# Patient Record
Sex: FEMALE | Race: BLACK OR AFRICAN AMERICAN | NOT HISPANIC OR LATINO | Employment: FULL TIME | URBAN - METROPOLITAN AREA
[De-identification: names, ages, dates, MRNs, and addresses within clinical notes are randomized per-mention and may not be internally consistent; named-entity substitution may affect disease eponyms.]

---

## 2017-06-30 ENCOUNTER — OFFICE VISIT (OUTPATIENT)
Dept: BARIATRICS | Facility: CLINIC | Age: 37
End: 2017-06-30
Payer: COMMERCIAL

## 2017-06-30 VITALS
SYSTOLIC BLOOD PRESSURE: 133 MMHG | HEIGHT: 67 IN | DIASTOLIC BLOOD PRESSURE: 77 MMHG | BODY MASS INDEX: 39.65 KG/M2 | RESPIRATION RATE: 16 BRPM | WEIGHT: 252.63 LBS | HEART RATE: 89 BPM | TEMPERATURE: 99 F

## 2017-06-30 DIAGNOSIS — M25.562 CHRONIC PAIN OF BOTH KNEES: ICD-10-CM

## 2017-06-30 DIAGNOSIS — G47.9 SLEEP DISTURBANCE: ICD-10-CM

## 2017-06-30 DIAGNOSIS — M25.561 CHRONIC PAIN OF BOTH KNEES: ICD-10-CM

## 2017-06-30 DIAGNOSIS — E66.01 MORBID OBESITY WITH BMI OF 40.0-44.9, ADULT: ICD-10-CM

## 2017-06-30 DIAGNOSIS — E66.01 MORBID OBESITY DUE TO EXCESS CALORIES: Primary | ICD-10-CM

## 2017-06-30 DIAGNOSIS — G89.29 CHRONIC PAIN OF BOTH KNEES: ICD-10-CM

## 2017-06-30 PROCEDURE — 99204 OFFICE O/P NEW MOD 45 MIN: CPT | Mod: S$GLB,,, | Performed by: SURGERY

## 2017-06-30 PROCEDURE — 99999 PR PBB SHADOW E&M-NEW PATIENT-LVL IV: CPT | Mod: PBBFAC,,, | Performed by: SURGERY

## 2017-06-30 RX ORDER — NORGESTIMATE AND ETHINYL ESTRADIOL 7DAYSX3 28
1 KIT ORAL DAILY
COMMUNITY
End: 2020-03-24 | Stop reason: CLARIF

## 2019-06-10 NOTE — PROGRESS NOTES
Initial Consult    Chief Complaint   Patient presents with    Weight Loss     0/3 MSD Consecutive       History of Present Illness:  Patient is a 36 y.o. female who is referred for evaluation of surgical treatment of morbid obesity. Her Body mass index is 39.57 kg/m². She has known comorbidities of urinary incontinence. She has attended the bariatric seminar and is most interested in gastric sleeve surgery.    Past attempts at weight loss include: Unsupervised: calorie counting, gym membership, low carbohydrates;  Supervised:  Diet pills from MD;  Diet pills: adipex, orlistat;  Exercise attempts: walking or running, treadmill, group classes, jogging    Weight history:   At current weight:  3 years  Obese for 10 years.  More than 35 pounds overweight for 10 years.  More than 100 pounds overweight for 3 years.  Started dieting at 29 years old.  Maximum weight reached: 252  Most weight lost was 30 pounds through calorie restriction, no sugar, no meat, no salt diet pills and exercise for 1 year.  She describes Her eating habits as sweet eater, snacker/grazer.    RICH screening: snores at home, wakes frequently at night, daytime fatigue, takes muscle relaxer to sleep, morning migraine    Reflux screening: Has daily but doesn't take anything for it    Review of patient's allergies indicates:   Allergen Reactions    Sulfasalazine Itching       Current Outpatient Prescriptions   Medication Sig Dispense Refill    norgestimate-ethinyl estradiol (ORTHO TRI-CYCLEN,TRI-SPRINTEC) 0.18/0.215/0.25 mg-35 mcg (28) tablet Take 1 tablet by mouth once daily.       No current facility-administered medications for this visit.        Past Medical History:   Diagnosis Date    Graves disease      Past Surgical History:   Procedure Laterality Date     SECTION      WISDOM TOOTH EXTRACTION       Family History   Problem Relation Age of Onset    Hypertension Mother     Hypertension Father     Heart disease Father     Kidney  failure Father     Diabetes Father     Hyperlipidemia Father     Stroke Father     No Known Problems Sister     No Known Problems Brother     No Known Problems Daughter     Heart disease Maternal Grandmother     Cancer Maternal Grandfather     Cancer Paternal Grandfather      stomach    No Known Problems Brother     No Known Problems Brother     No Known Problems Daughter      Social History   Substance Use Topics    Smoking status: Current Some Day Smoker     Packs/day: 0.50     Years: 10.00     Types: Cigarettes     Start date: 6/30/2007    Smokeless tobacco: Never Used    Alcohol use No          Review of Systems:  Review of Systems   Constitutional: Negative for activity change, appetite change, fever and unexpected weight change.   HENT: Positive for sinus pressure. Negative for congestion, sneezing, sore throat, tinnitus and voice change.    Eyes: Negative for pain, redness and itching.   Respiratory: Negative for apnea, cough, choking, chest tightness, shortness of breath, wheezing and stridor.    Cardiovascular: Negative for chest pain, palpitations and leg swelling.   Gastrointestinal: Negative for abdominal distention, abdominal pain, anal bleeding, blood in stool, constipation, diarrhea, nausea, rectal pain and vomiting.   Endocrine: Negative for cold intolerance and heat intolerance.   Genitourinary: Negative for difficulty urinating and dysuria.   Musculoskeletal: Positive for arthralgias, back pain and gait problem. Negative for joint swelling, myalgias, neck pain and neck stiffness.   Skin: Negative for rash and wound.   Allergic/Immunologic: Negative for environmental allergies and food allergies.   Neurological: Positive for light-headedness and headaches. Negative for dizziness.   Hematological: Negative for adenopathy. Does not bruise/bleed easily.   Psychiatric/Behavioral: Negative for agitation and confusion.       Physical:     Vital Signs (Most Recent)  Temp: 99.1 °F (37.3 °C)  "(06/30/17 1431)  Pulse: 89 (06/30/17 1431)  Resp: 16 (06/30/17 1431)  BP: 133/77 (06/30/17 1431)  5' 7" (1.702 m)  114.6 kg (252 lb 10.4 oz)   Neck 14.5 inch    Body comp:  Fat Percent:  47.8 %  Fat Mass:  119 lb  FFM:  130 lb  TBW: 95.6 lb  TBW %:  38.4 %  BMR: 1861 kcal          Physical Exam:  Physical Exam   Constitutional: She is oriented to person, place, and time. She appears well-developed and well-nourished. No distress.   HENT:   Head: Normocephalic and atraumatic.   Mouth/Throat: No oropharyngeal exudate.   Eyes: Conjunctivae and EOM are normal. Pupils are equal, round, and reactive to light. No scleral icterus.   Neck: Normal range of motion. Neck supple. No JVD present. No tracheal deviation present. No thyromegaly present.   Cardiovascular: Normal rate, regular rhythm and normal heart sounds.  Exam reveals no gallop and no friction rub.    No murmur heard.  Pulmonary/Chest: Effort normal and breath sounds normal. No stridor. No respiratory distress. She has no wheezes. She has no rales. She exhibits no tenderness.   Abdominal: Soft. Bowel sounds are normal. She exhibits no distension and no mass. There is no tenderness. There is no rebound and no guarding.   Musculoskeletal: Normal range of motion. She exhibits edema. She exhibits no tenderness.   Lymphadenopathy:     She has no cervical adenopathy.   Neurological: She is alert and oriented to person, place, and time. No cranial nerve deficit.   Skin: Skin is warm and dry. No rash noted. She is not diaphoretic. No erythema.   Psychiatric: She has a normal mood and affect. Her behavior is normal.   Nursing note and vitals reviewed.      ASSESSMENT/PLAN:        1. Morbid obesity due to excess calories  BMP    CBC w/ Auto Differential    Folate Serum    H. Pylori Antibody, IGG    Hg A1c    Hepatic Panel    Iron & TIBC    Lipid Profile    Magnesium    Phosphorus    T3    T4    TSH    Free T4    Vitamin B12    Vitamin B1    Vitamin D 25 Hydroxy    EKG " 12-lead    FL Upper GI Without KUB    Ambulatory consult to Psychology    Ambulatory consult to Nutrition Services   2. Morbid obesity with BMI of 40.0-44.9, adult     3. Chronic pain of both knees  X-Ray Knee 1 or 2 View Bilateral   4. Sleep disturbance  Polysomnography 4 or more parameters       Plan:  Morelia Geiger has morbid obesity as their Body mass index is 39.57 kg/m².  She does not qualify for weight loss surgery at this time but has symptoms related to sleep apnea and arthritis.  I will pursue these studies and find out if she has these problems which would qualify her for surgery.  In the meantime I will continue work up for dieting and possible surgery. She understands that this is a tool and lifestyle change will be necessary to keep weight off. I went over possible complications of all surgeries with the patient and she is agreeable to surgery.    She will need:    Labs  EKG  UGI   dietary consult  psych consult   Seminar  Sleep study   Xray of knees    I will obtain the following clearances prior to surgery: none        Diet plan: high protein low carb- mainly meats and vegetables  Exercise plan: Cardiovascular exercise, get HR over 100 for 20 minutes 3 times per week.  Start multivitamin      143

## 2020-01-06 ENCOUNTER — TELEPHONE (OUTPATIENT)
Dept: BARIATRICS | Facility: CLINIC | Age: 40
End: 2020-01-06

## 2020-01-06 NOTE — TELEPHONE ENCOUNTER
----- Message from Karsten Whitehead sent at 1/6/2020  8:53 AM CST -----  Contact: pt  Type:  Sooner Apoointment Request    Caller is requesting a sooner appointment.  Caller declined first available appointment listed below.  Caller will not accept being placed on the waitlist and is requesting a message be sent to doctor.    Name of Caller:  pt  When is the first available appointment?  01/22/2020    Best Call Back Number:  [phone]  Additional Information:  Pt has an appointment on 01/22/2020 and would like to know if she can be seen sooner than that. Please call to advise.

## 2020-03-24 ENCOUNTER — HOSPITAL ENCOUNTER (INPATIENT)
Facility: HOSPITAL | Age: 40
LOS: 9 days | Discharge: HOME | DRG: 177 | End: 2020-04-03
Attending: EMERGENCY MEDICINE | Admitting: INTERNAL MEDICINE
Payer: MEDICARE

## 2020-03-24 DIAGNOSIS — J12.82 PNEUMONIA DUE TO COVID-19 VIRUS: Primary | ICD-10-CM

## 2020-03-24 DIAGNOSIS — R00.0 TACHYCARDIA: ICD-10-CM

## 2020-03-24 DIAGNOSIS — J18.9 PNEUMONIA: ICD-10-CM

## 2020-03-24 DIAGNOSIS — R06.02 SOB (SHORTNESS OF BREATH): ICD-10-CM

## 2020-03-24 DIAGNOSIS — U07.1 PNEUMONIA DUE TO COVID-19 VIRUS: Primary | ICD-10-CM

## 2020-03-24 LAB
ALBUMIN SERPL BCP-MCNC: 3.3 G/DL (ref 3.5–5.2)
ALP SERPL-CCNC: 50 U/L (ref 55–135)
ALT SERPL W/O P-5'-P-CCNC: 36 U/L (ref 10–44)
ANION GAP SERPL CALC-SCNC: 12 MMOL/L (ref 8–16)
AST SERPL-CCNC: 70 U/L (ref 10–40)
BILIRUB SERPL-MCNC: 1.5 MG/DL (ref 0.1–1)
BNP SERPL-MCNC: 15 PG/ML (ref 0–99)
BUN SERPL-MCNC: 5 MG/DL (ref 6–20)
CALCIUM SERPL-MCNC: 8.2 MG/DL (ref 8.7–10.5)
CHLORIDE SERPL-SCNC: 105 MMOL/L (ref 95–110)
CK MB SERPL-MCNC: 0.7 NG/ML (ref 0.1–6.5)
CK SERPL-CCNC: 476 U/L (ref 20–180)
CO2 SERPL-SCNC: 21 MMOL/L (ref 23–29)
CREAT SERPL-MCNC: 0.9 MG/DL (ref 0.5–1.4)
EST. GFR  (AFRICAN AMERICAN): >60 ML/MIN/1.73 M^2
EST. GFR  (NON AFRICAN AMERICAN): >60 ML/MIN/1.73 M^2
GLUCOSE SERPL-MCNC: 122 MG/DL (ref 70–110)
INFLUENZA A, MOLECULAR: NEGATIVE
INFLUENZA B, MOLECULAR: NEGATIVE
LACTATE SERPL-SCNC: 1.2 MMOL/L (ref 0.5–1.9)
LDH SERPL L TO P-CCNC: 388 U/L (ref 110–260)
POTASSIUM SERPL-SCNC: 3.6 MMOL/L (ref 3.5–5.1)
PROCALCITONIN SERPL IA-MCNC: 0.37 NG/ML (ref 0–0.5)
PROT SERPL-MCNC: 7.8 G/DL (ref 6–8.4)
SODIUM SERPL-SCNC: 138 MMOL/L (ref 136–145)
SPECIMEN SOURCE: NORMAL
TROPONIN I SERPL DL<=0.01 NG/ML-MCNC: <0.03 NG/ML

## 2020-03-24 PROCEDURE — 87502 INFLUENZA DNA AMP PROBE: CPT

## 2020-03-24 PROCEDURE — 82550 ASSAY OF CK (CPK): CPT

## 2020-03-24 PROCEDURE — 84484 ASSAY OF TROPONIN QUANT: CPT

## 2020-03-24 PROCEDURE — 96365 THER/PROPH/DIAG IV INF INIT: CPT

## 2020-03-24 PROCEDURE — 63600175 PHARM REV CODE 636 W HCPCS: Performed by: EMERGENCY MEDICINE

## 2020-03-24 PROCEDURE — U0002 COVID-19 LAB TEST NON-CDC: HCPCS

## 2020-03-24 PROCEDURE — 83880 ASSAY OF NATRIURETIC PEPTIDE: CPT

## 2020-03-24 PROCEDURE — 83615 LACTATE (LD) (LDH) ENZYME: CPT

## 2020-03-24 PROCEDURE — 81025 URINE PREGNANCY TEST: CPT | Performed by: EMERGENCY MEDICINE

## 2020-03-24 PROCEDURE — 99285 EMERGENCY DEPT VISIT HI MDM: CPT | Mod: 25

## 2020-03-24 PROCEDURE — 87040 BLOOD CULTURE FOR BACTERIA: CPT | Mod: 59

## 2020-03-24 PROCEDURE — 93005 ELECTROCARDIOGRAM TRACING: CPT | Performed by: INTERNAL MEDICINE

## 2020-03-24 PROCEDURE — 36415 COLL VENOUS BLD VENIPUNCTURE: CPT

## 2020-03-24 PROCEDURE — 82553 CREATINE MB FRACTION: CPT

## 2020-03-24 PROCEDURE — 80053 COMPREHEN METABOLIC PANEL: CPT

## 2020-03-24 PROCEDURE — 82728 ASSAY OF FERRITIN: CPT

## 2020-03-24 PROCEDURE — 83605 ASSAY OF LACTIC ACID: CPT

## 2020-03-24 PROCEDURE — 84145 PROCALCITONIN (PCT): CPT

## 2020-03-24 RX ORDER — NORGESTIMATE AND ETHINYL ESTRADIOL 0.25-0.035
1 KIT ORAL DAILY
COMMUNITY
Start: 2020-02-20

## 2020-03-24 RX ADMIN — CEFTRIAXONE SODIUM 1 G: 1 INJECTION, POWDER, FOR SOLUTION INTRAMUSCULAR; INTRAVENOUS at 11:03

## 2020-03-25 PROBLEM — J18.9 PNEUMONIA: Status: ACTIVE | Noted: 2020-03-25

## 2020-03-25 PROBLEM — Z20.822 SUSPECTED COVID-19 VIRUS INFECTION: Status: ACTIVE | Noted: 2020-03-25

## 2020-03-25 LAB
B-HCG UR QL: NEGATIVE
BACTERIA #/AREA URNS HPF: ABNORMAL /HPF
BASOPHILS # BLD AUTO: 0.01 K/UL (ref 0–0.2)
BASOPHILS NFR BLD: 0.3 % (ref 0–1.9)
BILIRUB UR QL STRIP: ABNORMAL
CLARITY UR: ABNORMAL
COLOR UR: YELLOW
CRP SERPL-MCNC: 2.88 MG/DL (ref 0–0.75)
CTP QC/QA: YES
D DIMER PPP IA.FEU-MCNC: 0.79 UG/ML FEU
DIFFERENTIAL METHOD: ABNORMAL
EOSINOPHIL # BLD AUTO: 0 K/UL (ref 0–0.5)
EOSINOPHIL NFR BLD: 0.9 % (ref 0–8)
ERYTHROCYTE [DISTWIDTH] IN BLOOD BY AUTOMATED COUNT: 15.6 % (ref 11.5–14.5)
ERYTHROCYTE [SEDIMENTATION RATE] IN BLOOD BY WESTERGREN METHOD: 58 MM/HR (ref 0–20)
FERRITIN SERPL-MCNC: 318 NG/ML (ref 20–300)
GLUCOSE UR QL STRIP: ABNORMAL
HCT VFR BLD AUTO: 41.3 % (ref 37–48.5)
HGB BLD-MCNC: 12.5 G/DL (ref 12–16)
HGB UR QL STRIP: ABNORMAL
HYALINE CASTS #/AREA URNS LPF: 91 /LPF
IMM GRANULOCYTES # BLD AUTO: 0.02 K/UL (ref 0–0.04)
IMM GRANULOCYTES NFR BLD AUTO: 0.6 % (ref 0–0.5)
INR PPP: 1
KETONES UR QL STRIP: ABNORMAL
LACTATE SERPL-SCNC: 1.2 MMOL/L (ref 0.5–1.9)
LEUKOCYTE ESTERASE UR QL STRIP: NEGATIVE
LYMPHOCYTES # BLD AUTO: 0.8 K/UL (ref 1–4.8)
LYMPHOCYTES NFR BLD: 21.9 % (ref 18–48)
MCH RBC QN AUTO: 25.8 PG (ref 27–31)
MCHC RBC AUTO-ENTMCNC: 30.3 G/DL (ref 32–36)
MCV RBC AUTO: 85 FL (ref 82–98)
MICROSCOPIC COMMENT: ABNORMAL
MONOCYTES # BLD AUTO: 0.1 K/UL (ref 0.3–1)
MONOCYTES NFR BLD: 3.8 % (ref 4–15)
NEUTROPHILS # BLD AUTO: 2.5 K/UL (ref 1.8–7.7)
NEUTROPHILS NFR BLD: 72.5 % (ref 38–73)
NITRITE UR QL STRIP: NEGATIVE
NRBC BLD-RTO: 0 /100 WBC
PH UR STRIP: 7 [PH] (ref 5–8)
PLATELET # BLD AUTO: 125 K/UL (ref 150–350)
PMV BLD AUTO: 11.1 FL (ref 9.2–12.9)
PROT UR QL STRIP: ABNORMAL
PROTHROMBIN TIME: 12.7 SEC (ref 10.6–14.8)
RBC # BLD AUTO: 4.84 M/UL (ref 4–5.4)
RBC #/AREA URNS HPF: 21 /HPF (ref 0–4)
SP GR UR STRIP: >1.03 (ref 1–1.03)
SQUAMOUS #/AREA URNS HPF: 15 /HPF
URN SPEC COLLECT METH UR: ABNORMAL
UROBILINOGEN UR STRIP-ACNC: >=8 EU/DL
WBC # BLD AUTO: 3.42 K/UL (ref 3.9–12.7)
WBC #/AREA URNS HPF: 10 /HPF (ref 0–5)

## 2020-03-25 PROCEDURE — 81001 URINALYSIS AUTO W/SCOPE: CPT

## 2020-03-25 PROCEDURE — 36415 COLL VENOUS BLD VENIPUNCTURE: CPT

## 2020-03-25 PROCEDURE — 85379 FIBRIN DEGRADATION QUANT: CPT

## 2020-03-25 PROCEDURE — 96368 THER/DIAG CONCURRENT INF: CPT

## 2020-03-25 PROCEDURE — 63600175 PHARM REV CODE 636 W HCPCS: Performed by: EMERGENCY MEDICINE

## 2020-03-25 PROCEDURE — 63600175 PHARM REV CODE 636 W HCPCS: Performed by: NURSE PRACTITIONER

## 2020-03-25 PROCEDURE — 25000003 PHARM REV CODE 250: Performed by: INTERNAL MEDICINE

## 2020-03-25 PROCEDURE — 85610 PROTHROMBIN TIME: CPT

## 2020-03-25 PROCEDURE — 25000003 PHARM REV CODE 250: Performed by: FAMILY MEDICINE

## 2020-03-25 PROCEDURE — 63600175 PHARM REV CODE 636 W HCPCS: Performed by: INTERNAL MEDICINE

## 2020-03-25 PROCEDURE — 85025 COMPLETE CBC W/AUTO DIFF WBC: CPT

## 2020-03-25 PROCEDURE — 27000221 HC OXYGEN, UP TO 24 HOURS

## 2020-03-25 PROCEDURE — 94761 N-INVAS EAR/PLS OXIMETRY MLT: CPT

## 2020-03-25 PROCEDURE — 86140 C-REACTIVE PROTEIN: CPT

## 2020-03-25 PROCEDURE — 83605 ASSAY OF LACTIC ACID: CPT

## 2020-03-25 PROCEDURE — 12000002 HC ACUTE/MED SURGE SEMI-PRIVATE ROOM

## 2020-03-25 PROCEDURE — 85041 AUTOMATED RBC COUNT: CPT

## 2020-03-25 PROCEDURE — 85651 RBC SED RATE NONAUTOMATED: CPT

## 2020-03-25 PROCEDURE — 25000003 PHARM REV CODE 250: Performed by: NURSE PRACTITIONER

## 2020-03-25 RX ORDER — SODIUM,POTASSIUM PHOSPHATES 280-250MG
2 POWDER IN PACKET (EA) ORAL
Status: DISCONTINUED | OUTPATIENT
Start: 2020-03-25 | End: 2020-04-03 | Stop reason: HOSPADM

## 2020-03-25 RX ORDER — ATORVASTATIN CALCIUM 40 MG/1
40 TABLET, FILM COATED ORAL NIGHTLY
Status: DISCONTINUED | OUTPATIENT
Start: 2020-03-25 | End: 2020-04-03 | Stop reason: HOSPADM

## 2020-03-25 RX ORDER — ONDANSETRON 2 MG/ML
4 INJECTION INTRAMUSCULAR; INTRAVENOUS EVERY 8 HOURS PRN
Status: DISCONTINUED | OUTPATIENT
Start: 2020-03-25 | End: 2020-04-03 | Stop reason: HOSPADM

## 2020-03-25 RX ORDER — POTASSIUM CHLORIDE 20 MEQ/15ML
40 SOLUTION ORAL
Status: DISCONTINUED | OUTPATIENT
Start: 2020-03-25 | End: 2020-04-03 | Stop reason: HOSPADM

## 2020-03-25 RX ORDER — BISACODYL 10 MG
10 SUPPOSITORY, RECTAL RECTAL DAILY PRN
Status: DISCONTINUED | OUTPATIENT
Start: 2020-03-25 | End: 2020-04-03 | Stop reason: HOSPADM

## 2020-03-25 RX ORDER — LANOLIN ALCOHOL/MO/W.PET/CERES
800 CREAM (GRAM) TOPICAL
Status: DISCONTINUED | OUTPATIENT
Start: 2020-03-25 | End: 2020-04-03 | Stop reason: HOSPADM

## 2020-03-25 RX ORDER — LOPERAMIDE HYDROCHLORIDE 2 MG/1
2 CAPSULE ORAL EVERY 6 HOURS PRN
Status: DISCONTINUED | OUTPATIENT
Start: 2020-03-25 | End: 2020-04-03 | Stop reason: HOSPADM

## 2020-03-25 RX ORDER — HYDROCODONE POLISTIREX AND CHLORPHENIRAMINE POLISTIREX 10; 8 MG/5ML; MG/5ML
5 SUSPENSION, EXTENDED RELEASE ORAL EVERY 12 HOURS PRN
Status: DISCONTINUED | OUTPATIENT
Start: 2020-03-25 | End: 2020-04-03 | Stop reason: HOSPADM

## 2020-03-25 RX ORDER — ACETAMINOPHEN 325 MG/1
650 TABLET ORAL EVERY 8 HOURS PRN
Status: DISCONTINUED | OUTPATIENT
Start: 2020-03-25 | End: 2020-03-29

## 2020-03-25 RX ORDER — GUAIFENESIN 600 MG/1
600 TABLET, EXTENDED RELEASE ORAL 2 TIMES DAILY
Status: DISCONTINUED | OUTPATIENT
Start: 2020-03-25 | End: 2020-04-03 | Stop reason: HOSPADM

## 2020-03-25 RX ORDER — SODIUM CHLORIDE 0.9 % (FLUSH) 0.9 %
10 SYRINGE (ML) INJECTION
Status: DISCONTINUED | OUTPATIENT
Start: 2020-03-25 | End: 2020-04-03 | Stop reason: HOSPADM

## 2020-03-25 RX ORDER — HYDROXYCHLOROQUINE SULFATE 200 MG/1
400 TABLET, FILM COATED ORAL DAILY
Status: COMPLETED | OUTPATIENT
Start: 2020-03-26 | End: 2020-03-29

## 2020-03-25 RX ORDER — ACETAMINOPHEN 325 MG/1
650 TABLET ORAL EVERY 4 HOURS PRN
Status: DISCONTINUED | OUTPATIENT
Start: 2020-03-25 | End: 2020-03-29

## 2020-03-25 RX ORDER — HYDROCODONE BITARTRATE AND ACETAMINOPHEN 5; 325 MG/1; MG/1
1 TABLET ORAL EVERY 6 HOURS PRN
Status: DISCONTINUED | OUTPATIENT
Start: 2020-03-25 | End: 2020-03-29

## 2020-03-25 RX ORDER — AZITHROMYCIN 250 MG/1
500 TABLET, FILM COATED ORAL
Status: DISPENSED | OUTPATIENT
Start: 2020-03-25 | End: 2020-03-28

## 2020-03-25 RX ORDER — ENOXAPARIN SODIUM 100 MG/ML
40 INJECTION SUBCUTANEOUS EVERY 24 HOURS
Status: DISCONTINUED | OUTPATIENT
Start: 2020-03-25 | End: 2020-04-03 | Stop reason: HOSPADM

## 2020-03-25 RX ORDER — HYDROXYCHLOROQUINE SULFATE 200 MG/1
400 TABLET, FILM COATED ORAL 2 TIMES DAILY
Status: COMPLETED | OUTPATIENT
Start: 2020-03-25 | End: 2020-03-25

## 2020-03-25 RX ORDER — TALC
6 POWDER (GRAM) TOPICAL NIGHTLY PRN
Status: DISCONTINUED | OUTPATIENT
Start: 2020-03-25 | End: 2020-04-03 | Stop reason: HOSPADM

## 2020-03-25 RX ADMIN — HYDROCODONE POLISTIREX AND CHLORPHENIRAMINE POLISITREX 5 ML: 10; 8 SUSPENSION, EXTENDED RELEASE ORAL at 03:03

## 2020-03-25 RX ADMIN — AZITHROMYCIN MONOHYDRATE 500 MG: 500 INJECTION, POWDER, LYOPHILIZED, FOR SOLUTION INTRAVENOUS at 12:03

## 2020-03-25 RX ADMIN — HYDROXYCHLOROQUINE SULFATE 400 MG: 200 TABLET, FILM COATED ORAL at 08:03

## 2020-03-25 RX ADMIN — ENOXAPARIN SODIUM 40 MG: 100 INJECTION SUBCUTANEOUS at 04:03

## 2020-03-25 RX ADMIN — HYDROXYCHLOROQUINE SULFATE 400 MG: 200 TABLET, FILM COATED ORAL at 09:03

## 2020-03-25 RX ADMIN — GUAIFENESIN 600 MG: 600 TABLET, EXTENDED RELEASE ORAL at 10:03

## 2020-03-25 RX ADMIN — ATORVASTATIN CALCIUM 40 MG: 40 TABLET, FILM COATED ORAL at 08:03

## 2020-03-25 RX ADMIN — MELATONIN 6 MG: at 03:03

## 2020-03-25 RX ADMIN — ONDANSETRON 4 MG: 2 INJECTION INTRAMUSCULAR; INTRAVENOUS at 03:03

## 2020-03-25 RX ADMIN — CEFTRIAXONE 1 G: 1 INJECTION, SOLUTION INTRAVENOUS at 10:03

## 2020-03-25 RX ADMIN — ACETAMINOPHEN 650 MG: 325 TABLET ORAL at 03:03

## 2020-03-25 RX ADMIN — ACETAMINOPHEN 650 MG: 325 TABLET ORAL at 04:03

## 2020-03-25 NOTE — PLAN OF CARE
03/25/20 0826   Discharge Assessment   Assessment Type Discharge Planning Assessment   Confirmed/corrected address and phone number on facesheet? Yes   Assessment information obtained from? Patient   Communicated expected length of stay with patient/caregiver no   Prior to hospitilization cognitive status: Alert/Oriented   Prior to hospitalization functional status: Independent   Current cognitive status: Alert/Oriented   Current Functional Status: Independent   Facility Arrived From: home   Lives With spouse;child(lobo), dependent   Able to Return to Prior Arrangements yes   Is patient able to care for self after discharge? Yes   Readmission Within the Last 30 Days no previous admission in last 30 days   Patient currently being followed by outpatient case management? No   Patient currently receives any other outside agency services? No   Equipment Currently Used at Home none   Do you have any problems affording any of your prescribed medications? No   Is the patient taking medications as prescribed? yes   Does the patient have transportation home? Yes   Transportation Anticipated family or friend will provide   Dialysis Name and Scheduled days n/a   Does the patient receive services at the [facility]? No   Discharge Plan A Home   Discharge Plan B Home with family   DME Needed Upon Discharge  none   Patient/Family in Agreement with Plan yes     CM called into patients room, introduced self and asked if ok to do interview over phone.

## 2020-03-25 NOTE — HPI
The patient is a 39 year old female former smoker with no significant medical history. She presented to the ED with persistent moderate to severe nonproductive cough, associated with fever and mild-moderate shortness of breath for a week. She also reports poor appetite with mild-moderate nausea and some loose stools. She has mid-moderate mid chest pain with cough. She denies sick contacts or recent traveling. 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    In the ED:  Febrile, 101.5  Tachycardia, 100s -110s  O2 saturation 94- 95% on RA   2  Lactic acid 1.2  Ferritin 318  D-dimer 0.79  Total bilirubin 1.5  AST 70, ALT 36  Lactate dehydrogenase 388  Procalcitonin 0.37  Flu negative  Chest radiograph, personally reviewed, bilateral interstitial/ground-glass type pulmonary opacities, right greater than left  EKG, personally reviewed, sinus tachycardia, rate 1006, no ST elevation  COVID-19 qualitative PCR done, report pending

## 2020-03-25 NOTE — ED PROVIDER NOTES
Encounter Date: 3/24/2020       History     Chief Complaint   Patient presents with    Shortness of Breath    Cough    Fever     39-year-old female with no significant past medical history presents with shortness of breath, fever and cough x1 week.  Patient states her symptoms have progressively gotten worse and she decided to come in for further assessment.  She has tried over-the-counter medication with no relief.  Patient denies any nausea, vomiting, chest pain or difficulty urinating.  She is otherwise stable and has no other complaints.        Review of patient's allergies indicates:   Allergen Reactions    Sulfasalazine Itching     Past Medical History:   Diagnosis Date    Graves disease      Past Surgical History:   Procedure Laterality Date     SECTION  2014    WISDOM TOOTH EXTRACTION       Family History   Problem Relation Age of Onset    Hypertension Mother     Hypertension Father     Heart disease Father     Kidney failure Father     Diabetes Father     Hyperlipidemia Father     Stroke Father     No Known Problems Sister     No Known Problems Brother     No Known Problems Daughter     Heart disease Maternal Grandmother     Cancer Maternal Grandfather     Cancer Paternal Grandfather         stomach    No Known Problems Brother     No Known Problems Brother     No Known Problems Daughter      Social History     Tobacco Use    Smoking status: Former Smoker     Packs/day: 0.50     Years: 10.00     Pack years: 5.00     Types: Cigarettes     Start date: 2007    Smokeless tobacco: Never Used   Substance Use Topics    Alcohol use: No    Drug use: No     Review of Systems   Constitutional: Positive for fatigue and fever.   HENT: Negative for sore throat.    Respiratory: Positive for cough and shortness of breath.    Cardiovascular: Negative for chest pain.   Gastrointestinal: Negative for nausea.   Genitourinary: Negative for dysuria.   Musculoskeletal: Positive for  myalgias. Negative for back pain.   Skin: Negative for rash.   Neurological: Negative for weakness.   Hematological: Does not bruise/bleed easily.   All other systems reviewed and are negative.      Physical Exam     Initial Vitals [03/24/20 2154]   BP Pulse Resp Temp SpO2   (!) 145/84 (!) 111 (!) 22 (!) 101.5 °F (38.6 °C) (!) 94 %      MAP       --         Physical Exam    Nursing note and vitals reviewed.  Constitutional: She appears well-developed and well-nourished. She appears distressed.   HENT:   Head: Normocephalic and atraumatic.   Mouth/Throat: Oropharynx is clear and moist.   Eyes: Conjunctivae and EOM are normal. Pupils are equal, round, and reactive to light.   Neck: Normal range of motion. No tracheal deviation present. No JVD present.   Cardiovascular: Regular rhythm, normal heart sounds and intact distal pulses. Tachycardia present.  Exam reveals no gallop and no friction rub.    No murmur heard.  Pulmonary/Chest: Breath sounds normal. She is in respiratory distress. She has no wheezes. She exhibits no tenderness.   Abdominal: Soft. Bowel sounds are normal. She exhibits no distension. There is no tenderness. There is no rebound and no guarding.   Musculoskeletal: Normal range of motion. She exhibits no edema or tenderness.   Neurological: She is alert and oriented to person, place, and time. She has normal strength. No cranial nerve deficit or sensory deficit.   Skin: Skin is warm and dry. Capillary refill takes less than 2 seconds. No erythema.   Psychiatric: She has a normal mood and affect.         ED Course   Procedures  Labs Reviewed   CBC W/ AUTO DIFFERENTIAL - Abnormal; Notable for the following components:       Result Value    WBC 3.42 (*)     Mean Corpuscular Hemoglobin 25.8 (*)     Mean Corpuscular Hemoglobin Conc 30.3 (*)     RDW 15.6 (*)     Platelets 125 (*)     Immature Granulocytes 0.6 (*)     Lymph # 0.8 (*)     Mono # 0.1 (*)     Mono% 3.8 (*)     All other components within normal  limits    Narrative:     Recoll. [id number] by [email or web address] at 03/24/2020 23:32, reason: Specimen   clotted   COMPREHENSIVE METABOLIC PANEL - Abnormal; Notable for the following components:    CO2 21 (*)     Glucose 122 (*)     BUN, Bld 5 (*)     Calcium 8.2 (*)     Albumin 3.3 (*)     Total Bilirubin 1.5 (*)     Alkaline Phosphatase 50 (*)     AST 70 (*)     All other components within normal limits   LACTATE DEHYDROGENASE - Abnormal; Notable for the following components:     (*)     All other components within normal limits   FERRITIN - Abnormal; Notable for the following components:    Ferritin 318 (*)     All other components within normal limits   CK - Abnormal; Notable for the following components:     (*)     All other components within normal limits   D DIMER, QUANTITATIVE - Abnormal; Notable for the following components:    D-Dimer 0.79 (*)     All other components within normal limits    Narrative:     Recoll. [id number] by MS1 at 03/24/2020 23:32, reason: Specimen   clotted   CULTURE, BLOOD   LACTIC ACID, PLASMA    Narrative:     Recoll. [id number] by [email or web address] at 03/24/2020 23:20, reason: NO SPECIMEN   IN LAB   INFLUENZA A AND B ANTIGEN    Narrative:     Specimen Source->Nasopharyngeal Swab   PROTIME-INR    Narrative:     Recoll. [id number] by MS1 at 03/24/2020 23:32, reason: Specimen   clotted   B-TYPE NATRIURETIC PEPTIDE   TROPONIN I   PROCALCITONIN   CK-MB   C-REACTIVE PROTEIN   SARS-COV-2 (COVID-19) QUALITATIVE PCR   POCT URINE PREGNANCY        ECG Results          EKG 12-lead (In process)  Result time 03/25/20 05:22:24    In process by Interface, Lab In [facility] (03/25/20 05:22:24)                 Narrative:    Test Reason : R00.0,    Vent. Rate : 106 BPM     Atrial Rate : 106 BPM     P-R Int : 148 ms          QRS Dur : 078 ms      QT Int : 332 ms       P-R-T Axes : 021 -04 015 degrees     QTc Int : 441 ms    Sinus tachycardia  Moderate voltage criteria for LVH, may be normal variant  Nonspecific T  wave abnormality  Abnormal ECG  No previous ECGs available    Referred By: [email or web address]   SELF           Confirmed By:                   In process by Interface, Lab In [facility] (03/25/20 05:17:14)                 Narrative:    Test Reason : R00.0,    Vent. Rate : 106 BPM     Atrial Rate : 106 BPM     P-R Int : 148 ms          QRS Dur : 078 ms      QT Int : 332 ms       P-R-T Axes : 021 -04 015 degrees     QTc Int : 441 ms    Sinus tachycardia  Moderate voltage criteria for LVH, may be normal variant  Nonspecific T wave abnormality  Abnormal ECG  No previous ECGs available    Referred By: [email or web address]   SELF           Confirmed By:                             Imaging Results          X-Ray Chest AP Portable (Final result)  Result time 03/24/20 22:39:53    Final result by Muna Alston IV, MD (03/24/20 22:39:53)                 Narrative:    EXAMINATION:  XR CHEST AP PORTABLE    CLINICAL HISTORY:  sob;    COMPARISON:  None.    FINDINGS:  The heart and mediastinum appear unremarkable. There is no acute pulmonary vascular engorgement.    Interstitial and ground-glass opacities are observed in the mid and lower lung zone on the right and to a lesser degree within the left lung base.  There is minimal blunting of the right costophrenic angle suggesting trace amount of pleural fluid or thickening.    IMPRESSION  Bilateral interstitial/ground-glass type pulmonary opacities, right greater than left.    Minor blunting of the right costophrenic angle.      Electronically signed by: Muna Alston IV., MD  Date:    03/24/2020  Time:    22:39                               Medical Decision Making:   Initial Assessment:   39-year-old female initial assessment in moderate distress secondary to flu-like symptoms.  Patient is alert oriented x3, neurologically neurovascular intact no focal deficits.  She is nontoxic-appearing and vitals are stable at this time.  Differential Diagnosis:   URI, pneumonia, corona virus, pericarditis, CHF versus  COPD exacerbation, sepsis  Independently Interpreted Test(s):   I have ordered and independently interpreted X-rays - see prior notes.  I have ordered and independently interpreted EKG Reading(s) - see prior notes  Clinical Tests:   Lab Tests: Ordered and Reviewed  The following lab test(s) were unremarkable: CBC, CMP, Urinalysis, UPT, Troponin, BNP, Lactate and D-Dimer  Radiological Study: Ordered and Reviewed  Medical Tests: Ordered and Reviewed  ED Management:  Patient has been reassessed noted to have no acute changes in her condition.  X-ray shows bilateral infiltrates and patient was treated for pneumonia with Rocephin and azithromycin.  Patient has been placed on isolation with negative influenza and we will swab for corona virus and continue to monitor treat patient.  She has been consult to hospitalist for admission which she will therefore continue her plan of care at that time.  Patient has otherwise remained stable while in the ED and Ms. Geiger is aware of the plan and in agreement.    Laboratory results and radiology imaging results reviewed.  Based on the patient's history, physical, and workup here in the emergency department I believe the patient requires admission for a diagnosis of PNA and r/o COVID-19.  I discussed the patient's case with the on-call hospitalist who has agreed to evaluate the patient for admission.  In addition, I discussed the results with the patient and they have verbalized understanding of the results, plan, and need for admission.    ________________________  WILBUR Montague MD   Emergency Medicine                                   Clinical Impression:       ICD-10-CM ICD-9-CM   1. Tachycardia R00.0 785.0   2. Pneumonia J18.9 486             ED Disposition Condition    Admit                           Tim Montague MD  03/25/20 0604

## 2020-03-25 NOTE — ASSESSMENT & PLAN NOTE
Suspected COVID - 19  Febrile with mild tachycardia  Nontoxic  Adequate oxygenation  WBC 3.42  Procalcitonin 0.37  Lactic acid 1.2  Lactate hydrogenase 388  AST 70  Flu negative  Chest radiograph: Bilateral interstitial/ground-glass type pulmonary opacities, right greater than left.  COVID -19 qualitative result pending  Blood cultures done  Abx w IV Rocephin and oral azithromycin  Add ESR and CRP  Continuous SPO2 monitoring  Antitussive  Airborne/contact/droplet isolation  Monitor labs: CBC, CMP, Mg, phos q 48 hours

## 2020-03-25 NOTE — NURSING
Pt transferred via stretcher to room 1114, wearing surgical mask. Pt c/o intermittent nausea, cough, sob, fever upon arrival to unit 101.5.

## 2020-03-25 NOTE — PROGRESS NOTES
Patient seen by medical provider this morning.  High suspicion of COVID-19 infection.  COVID-19 results pending.  Fever 101.5 at 3:17 a.m. this morning.  On IV antibiotics.  Patient reports feeling a little better today.  Shortness of breath is improved.  No shortness of breath at rest.  Currently on 2 L saturating 98%.  90% on room air.  Complains of coughing with deep inspiration.  Started on hydroxychloroquine for high suspicion of COVID-19, statin.  G6PD and CRP pending.    General: Patient resting comfortably in no acute distress. Appears as stated age. Calm. Fatigued-appearing. NC  Eyes: EOM intact. No conjunctivae injection. No scleral icterus.  ENT: Hearing grossly intact. No discharge from ears. No nasal discharge.   CVS: RRR. No LE edema BL.  Lungs: CTA BL, no wheezing or crackles. Good breath sounds. No accessory muscle use. No acute respiratory distress  Neuro: AOx3. GCS 15. Cranial nerves grossly intact. Moves all extremities equally. Follows commands. Responds appropriately

## 2020-03-25 NOTE — SUBJECTIVE & OBJECTIVE
Past Medical History:   Diagnosis Date    Graves disease        Past Surgical History:   Procedure Laterality Date     SECTION  2014    WISDOM TOOTH EXTRACTION         Review of patient's allergies indicates:   Allergen Reactions    Sulfasalazine Itching       No current facility-administered medications on file prior to encounter.      Current Outpatient Medications on File Prior to Encounter   Medication Sig    ESTARYLLA 0.25-35 mg-mcg per tablet Take 1 tablet by mouth once daily.     Family History     Problem Relation (Age of Onset)    Cancer Maternal Grandfather, Paternal Grandfather    Diabetes Father    Heart disease Father, Maternal Grandmother    Hyperlipidemia Father    Hypertension Mother, Father    Kidney failure Father    No Known Problems Sister, Brother, Daughter, Brother, Brother, Daughter    Stroke Father        Tobacco Use    Smoking status: Current Some Day Smoker     Packs/day: 0.50     Years: 10.00     Pack years: 5.00     Types: Cigarettes     Start date: 2007    Smokeless tobacco: Never Used   Substance and Sexual Activity    Alcohol use: No    Drug use: No    Sexual activity: Not on file     Review of Systems   All other systems reviewed and are negative.    Objective:     Vital Signs (Most Recent):  Temp: (!) 101.5 °F (38.6 °C) (20)  Pulse: 103 (20 2241)  Resp: (!) 22 (20)  BP: 119/72 (20 2241)  SpO2: 96 % (200) Vital Signs (24h Range):  Temp:  [101.5 °F (38.6 °C)] 101.5 °F (38.6 °C)  Pulse:  [103-111] 103  Resp:  [22] 22  SpO2:  [94 %-96 %] 96 %  BP: (119-145)/(72-84) 119/72     Weight: 117.9 kg (260 lb)  Body mass index is 39.53 kg/m².    Physical Exam   Constitutional: She is oriented to person, place, and time. She appears well-developed and well-nourished.   Nontoxic   HENT:   Head: Normocephalic and atraumatic.   Eyes: Right eye exhibits no discharge. Left eye exhibits no discharge. No scleral icterus.   Neck: Normal  range of motion. Neck supple. No JVD present.   Cardiovascular: Exam reveals no friction rub.   No murmur heard.  Tachycardic, mild   Pulmonary/Chest: She has no wheezes.   Coarse clear breath sounds   Abdominal: Soft. Bowel sounds are normal.   Genitourinary: Vagina normal.   Musculoskeletal: She exhibits no edema or deformity.   Neurological: She is alert and oriented to person, place, and time.   Skin: Skin is warm and dry. Capillary refill takes less than 2 seconds. No erythema.   Psychiatric: She has a normal mood and affect.   Vitals reviewed.          Significant Labs:   CBC:   Recent Labs   Lab 03/25/20  0003   WBC 3.42*   HGB 12.5   HCT 41.3   *     CMP:   Recent Labs   Lab 03/24/20  2230      K 3.6      CO2 21*   *   BUN 5*   CREATININE 0.9   CALCIUM 8.2*   PROT 7.8   ALBUMIN 3.3*   BILITOT 1.5*   ALKPHOS 50*   AST 70*   ALT 36   ANIONGAP 12   EGFRNONAA >60.0     Cardiac Markers:   Recent Labs   Lab 03/24/20  2230   BNP 15     Coagulation:   Recent Labs   Lab 03/25/20  0003   INR 1.0     Lactic Acid:   Recent Labs   Lab 03/24/20  2334   LACTATE 1.2     Troponin:   Recent Labs   Lab 03/24/20 2230   TROPONINI <0.030     All pertinent labs within the past 24 hours have been reviewed.    Significant Imaging: I have reviewed all pertinent imaging results/findings within the past 24 hours.     X-ray Chest Ap Portable    Result Date: 3/24/2020  EXAMINATION: XR CHEST AP PORTABLE CLINICAL HISTORY: sob; COMPARISON: None. FINDINGS: The heart and mediastinum appear unremarkable. There is no acute pulmonary vascular engorgement. Interstitial and ground-glass opacities are observed in the mid and lower lung zone on the right and to a lesser degree within the left lung base.  There is minimal blunting of the right costophrenic angle suggesting trace amount of pleural fluid or thickening. IMPRESSION Bilateral interstitial/ground-glass type pulmonary opacities, right greater than left. Minor  blunting of the right costophrenic angle. Electronically signed by: Muna Alston IV., MD Date:    03/24/2020 Time:    22:39  EKG, personally reviewed, ST, no ST elevation

## 2020-03-26 LAB
ALBUMIN SERPL BCP-MCNC: 3 G/DL (ref 3.5–5.2)
ALP SERPL-CCNC: 46 U/L (ref 55–135)
ALT SERPL W/O P-5'-P-CCNC: 34 U/L (ref 10–44)
ANION GAP SERPL CALC-SCNC: 12 MMOL/L (ref 8–16)
AST SERPL-CCNC: 61 U/L (ref 10–40)
BASOPHILS # BLD AUTO: 0 K/UL (ref 0–0.2)
BASOPHILS NFR BLD: 0 % (ref 0–1.9)
BILIRUB SERPL-MCNC: 1.1 MG/DL (ref 0.1–1)
BUN SERPL-MCNC: 6 MG/DL (ref 6–20)
CALCIUM SERPL-MCNC: 8.1 MG/DL (ref 8.7–10.5)
CHLORIDE SERPL-SCNC: 101 MMOL/L (ref 95–110)
CO2 SERPL-SCNC: 26 MMOL/L (ref 23–29)
CREAT SERPL-MCNC: 0.8 MG/DL (ref 0.5–1.4)
CRP SERPL-MCNC: 2.34 MG/DL (ref 0–0.75)
DIFFERENTIAL METHOD: ABNORMAL
EOSINOPHIL # BLD AUTO: 0 K/UL (ref 0–0.5)
EOSINOPHIL NFR BLD: 0 % (ref 0–8)
ERYTHROCYTE [DISTWIDTH] IN BLOOD BY AUTOMATED COUNT: 15.4 % (ref 11.5–14.5)
EST. GFR  (AFRICAN AMERICAN): >60 ML/MIN/1.73 M^2
EST. GFR  (NON AFRICAN AMERICAN): >60 ML/MIN/1.73 M^2
G6PD BLD QN: 178 U/10E12 RBC (ref 146–376)
GLUCOSE SERPL-MCNC: 107 MG/DL (ref 70–110)
HCT VFR BLD AUTO: 36.2 % (ref 37–48.5)
HGB BLD-MCNC: 11.3 G/DL (ref 12–16)
IMM GRANULOCYTES # BLD AUTO: 0.01 K/UL (ref 0–0.04)
IMM GRANULOCYTES NFR BLD AUTO: 0.3 % (ref 0–0.5)
LYMPHOCYTES # BLD AUTO: 1.2 K/UL (ref 1–4.8)
LYMPHOCYTES NFR BLD: 35.1 % (ref 18–48)
MCH RBC QN AUTO: 25.8 PG (ref 27–31)
MCHC RBC AUTO-ENTMCNC: 31.2 G/DL (ref 32–36)
MCV RBC AUTO: 83 FL (ref 82–98)
MONOCYTES # BLD AUTO: 0.2 K/UL (ref 0.3–1)
MONOCYTES NFR BLD: 4.8 % (ref 4–15)
NEUTROPHILS # BLD AUTO: 2 K/UL (ref 1.8–7.7)
NEUTROPHILS NFR BLD: 59.8 % (ref 38–73)
NRBC BLD-RTO: 0 /100 WBC
PLATELET # BLD AUTO: 214 K/UL (ref 150–350)
PLATELET BLD QL SMEAR: ABNORMAL
PMV BLD AUTO: 10.3 FL (ref 9.2–12.9)
POTASSIUM SERPL-SCNC: 2.8 MMOL/L (ref 3.5–5.1)
PROCALCITONIN SERPL IA-MCNC: 0.06 NG/ML (ref 0–0.5)
PROT SERPL-MCNC: 7.4 G/DL (ref 6–8.4)
RBC # BLD AUTO: 4.33 X10E6/UL (ref 3.77–5.28)
RBC # BLD AUTO: 4.38 M/UL (ref 4–5.4)
SODIUM SERPL-SCNC: 139 MMOL/L (ref 136–145)
WBC # BLD AUTO: 3.36 K/UL (ref 3.9–12.7)

## 2020-03-26 PROCEDURE — 25000003 PHARM REV CODE 250: Performed by: FAMILY MEDICINE

## 2020-03-26 PROCEDURE — 80053 COMPREHEN METABOLIC PANEL: CPT

## 2020-03-26 PROCEDURE — 63600175 PHARM REV CODE 636 W HCPCS: Performed by: NURSE PRACTITIONER

## 2020-03-26 PROCEDURE — 25000003 PHARM REV CODE 250: Performed by: NURSE PRACTITIONER

## 2020-03-26 PROCEDURE — 84145 PROCALCITONIN (PCT): CPT

## 2020-03-26 PROCEDURE — 36415 COLL VENOUS BLD VENIPUNCTURE: CPT

## 2020-03-26 PROCEDURE — 86140 C-REACTIVE PROTEIN: CPT

## 2020-03-26 PROCEDURE — 94761 N-INVAS EAR/PLS OXIMETRY MLT: CPT

## 2020-03-26 PROCEDURE — 12000002 HC ACUTE/MED SURGE SEMI-PRIVATE ROOM

## 2020-03-26 PROCEDURE — 63600175 PHARM REV CODE 636 W HCPCS: Performed by: INTERNAL MEDICINE

## 2020-03-26 PROCEDURE — 25000003 PHARM REV CODE 250: Performed by: INTERNAL MEDICINE

## 2020-03-26 PROCEDURE — 85025 COMPLETE CBC W/AUTO DIFF WBC: CPT

## 2020-03-26 RX ORDER — LANOLIN ALCOHOL/MO/W.PET/CERES
800 CREAM (GRAM) TOPICAL
Status: DISCONTINUED | OUTPATIENT
Start: 2020-03-26 | End: 2020-04-03 | Stop reason: HOSPADM

## 2020-03-26 RX ORDER — POTASSIUM CHLORIDE 7.45 MG/ML
40 INJECTION INTRAVENOUS
Status: DISCONTINUED | OUTPATIENT
Start: 2020-03-26 | End: 2020-04-03 | Stop reason: HOSPADM

## 2020-03-26 RX ORDER — MAGNESIUM SULFATE HEPTAHYDRATE 40 MG/ML
2 INJECTION, SOLUTION INTRAVENOUS
Status: DISCONTINUED | OUTPATIENT
Start: 2020-03-26 | End: 2020-04-03 | Stop reason: HOSPADM

## 2020-03-26 RX ORDER — POTASSIUM CHLORIDE 20 MEQ/1
20 TABLET, EXTENDED RELEASE ORAL
Status: DISCONTINUED | OUTPATIENT
Start: 2020-03-26 | End: 2020-04-03 | Stop reason: HOSPADM

## 2020-03-26 RX ORDER — POTASSIUM CHLORIDE 20 MEQ/1
60 TABLET, EXTENDED RELEASE ORAL ONCE
Status: COMPLETED | OUTPATIENT
Start: 2020-03-26 | End: 2020-03-26

## 2020-03-26 RX ORDER — POTASSIUM CHLORIDE 7.45 MG/ML
20 INJECTION INTRAVENOUS
Status: DISCONTINUED | OUTPATIENT
Start: 2020-03-26 | End: 2020-04-03 | Stop reason: HOSPADM

## 2020-03-26 RX ORDER — POTASSIUM CHLORIDE 20 MEQ/1
40 TABLET, EXTENDED RELEASE ORAL
Status: DISCONTINUED | OUTPATIENT
Start: 2020-03-26 | End: 2020-04-03 | Stop reason: HOSPADM

## 2020-03-26 RX ORDER — MAGNESIUM SULFATE 1 G/100ML
1 INJECTION INTRAVENOUS
Status: DISCONTINUED | OUTPATIENT
Start: 2020-03-26 | End: 2020-04-03 | Stop reason: HOSPADM

## 2020-03-26 RX ORDER — MAGNESIUM SULFATE HEPTAHYDRATE 40 MG/ML
4 INJECTION, SOLUTION INTRAVENOUS
Status: DISCONTINUED | OUTPATIENT
Start: 2020-03-26 | End: 2020-04-03 | Stop reason: HOSPADM

## 2020-03-26 RX ADMIN — HYDROXYCHLOROQUINE SULFATE 400 MG: 200 TABLET, FILM COATED ORAL at 09:03

## 2020-03-26 RX ADMIN — HYDROCODONE BITARTRATE AND ACETAMINOPHEN 1 TABLET: 5; 325 TABLET ORAL at 01:03

## 2020-03-26 RX ADMIN — GUAIFENESIN 600 MG: 600 TABLET, EXTENDED RELEASE ORAL at 09:03

## 2020-03-26 RX ADMIN — CEFTRIAXONE 1 G: 1 INJECTION, SOLUTION INTRAVENOUS at 10:03

## 2020-03-26 RX ADMIN — POTASSIUM CHLORIDE 60 MEQ: 20 SOLUTION ORAL at 06:03

## 2020-03-26 RX ADMIN — ACETAMINOPHEN 650 MG: 325 TABLET ORAL at 07:03

## 2020-03-26 RX ADMIN — AZITHROMYCIN MONOHYDRATE 500 MG: 250 TABLET ORAL at 01:03

## 2020-03-26 RX ADMIN — POTASSIUM CHLORIDE 60 MEQ: 20 TABLET, EXTENDED RELEASE ORAL at 01:03

## 2020-03-26 RX ADMIN — ATORVASTATIN CALCIUM 40 MG: 40 TABLET, FILM COATED ORAL at 07:03

## 2020-03-26 RX ADMIN — HYDROCODONE POLISTIREX AND CHLORPHENIRAMINE POLISITREX 5 ML: 10; 8 SUSPENSION, EXTENDED RELEASE ORAL at 07:03

## 2020-03-26 RX ADMIN — MELATONIN 6 MG: at 03:03

## 2020-03-26 RX ADMIN — ACETAMINOPHEN 650 MG: 325 TABLET ORAL at 09:03

## 2020-03-26 RX ADMIN — GUAIFENESIN 600 MG: 600 TABLET, EXTENDED RELEASE ORAL at 07:03

## 2020-03-26 RX ADMIN — ENOXAPARIN SODIUM 40 MG: 100 INJECTION SUBCUTANEOUS at 05:03

## 2020-03-26 NOTE — PROGRESS NOTES
Progress Note  Patient Name: Morelia Geiger  MRN: [id number]  Admit Date: 3/24/2020   LOS: 1 day      SUBJECTIVE:     Principle problem: Pneumonia    Follow-up For:  Patient continued to have fever past 24 hr, 102.1 yesterday at 5:00 p.m. on IV antibiotics and hydroxychloroquine for suspected COVID-19 infection.  Currently on 2 L nasal cannula saturating 95%.  Breathing comfortably on room air at 92%. Continues to be leukopenic.  CRP trending down.  Procalcitonin continues to be low.  Blood cultures continued to be no growth to date. Complains shortness of breath with ambulation.  Complains of body aches when she has fevers.  COVID-19 results pending    Scheduled Meds:   atorvastatin  40 mg Oral QHS    azithromycin  500 mg Oral Q24H    cefTRIAXone (ROCEPHIN) IVPB  1 g Intravenous Q24H    enoxaparin  40 mg Subcutaneous Daily    guaiFENesin  600 mg Oral BID    hydroxychloroquine  400 mg Oral Daily    potassium chloride  60 mEq Oral Once     Continuous Infusions:  PRN Meds:acetaminophen, acetaminophen, bisacodyL, calcium chloride IVPB, calcium chloride IVPB, calcium chloride IVPB, HYDROcodone-acetaminophen, hydrocodone-chlorpheniramine, loperamide, magnesium oxide, magnesium oxide, magnesium oxide, magnesium sulfate IVPB, magnesium sulfate IVPB, magnesium sulfate IVPB, magnesium sulfate IVPB, melatonin, ondansetron, potassium chloride in water, potassium chloride in water, potassium chloride in water, potassium chloride in water, potassium chloride 10%, potassium chloride 10%, potassium chloride 10%, potassium chloride, potassium chloride, potassium chloride, potassium chloride, potassium, sodium phosphates, potassium, sodium phosphates, potassium, sodium phosphates, promethazine (PHENERGAN) IVPB, sodium chloride 0.9%, sodium phosphate IVPB, sodium phosphate IVPB, sodium phosphate IVPB, sodium phosphate IVPB, sodium phosphate IVPB    Review of patient's allergies indicates:   Allergen Reactions    Sulfasalazine  Itching       Review of Systems  As per subjective  Constitutional: Negative for chills, diaphoresis, fatigue, fever and unexpected weight change.   HENT: Negative for congestion, ear pain, postnasal drip, rhinorrhea, sinus pressure, sneezing, sore throat, trouble swallowing and voice change.    Eyes: Negative for pain, discharge, itching and visual disturbance.   Respiratory: Negative for cough, chest tightness, shortness of breath, wheezing and stridor.    Cardiovascular: Negative for chest pain, palpitations and leg swelling.   Gastrointestinal: Negative for abdominal pain, blood in stool, constipation, diarrhea, nausea and vomiting.   Endocrine: Negative for cold intolerance, heat intolerance, polydipsia, polyphagia and polyuria.   Genitourinary: Negative for difficulty urinating, dysuria, flank pain, frequency, hematuria and urgency.   Musculoskeletal: Negative for arthralgias, back pain, joint swelling and myalgias.   Skin: Negative for pallor, rash and wound.   Neurological: Negative for dizziness, seizures, syncope, weakness, light-headedness and headaches.      OBJECTIVE:     Vital Signs (Most Recent)  Temp: (!) 101.2 °F (38.4 °C) (03/26/20 0914)  Pulse: (!) 113 (03/26/20 0914)  Resp: 18 (03/26/20 0914)  BP: 113/74 (03/26/20 0914)  SpO2: 98 % (03/26/20 0914)    Vital Signs Range (Last 24H):  Temp:  [99.2 °F (37.3 °C)-102.1 °F (38.9 °C)]   Pulse:  []   Resp:  [16-18]   BP: (108-131)/(54-78)   SpO2:  [95 %-98 %]     I & O (Last 24H):    Intake/Output Summary (Last 24 hours) at 3/26/2020 1244  Last data filed at 3/26/2020 0337  Gross per 24 hour   Intake 720 ml   Output 600 ml   Net 120 ml     Physical Exam:  General: Patient resting comfortably in no acute distress. Appears as stated age. Calm. Fatigued appearing  Eyes: EOM intact. No conjunctivae injection. No scleral icterus.  ENT: Hearing grossly intact. No discharge from ears. No nasal discharge.   CVS: RRR. No LE edema BL.  Lungs: CTA BL, no  wheezing or crackles. Good breath sounds. No accessory muscle use. No acute respiratory distress  Neuro: AOx3. GCS 15. Cranial nerves grossly intact. Moves all extremities equally. Follows commands. Responds appropriately     Laboratory:  BMP:   Recent Labs   Lab 03/26/20  0535         K 2.8*      CO2 26   BUN 6   CREATININE 0.8   CALCIUM 8.1*     CMP:   Recent Labs   Lab 03/26/20  0535      CALCIUM 8.1*   ALBUMIN 3.0*   PROT 7.4      K 2.8*   CO2 26      BUN 6   CREATININE 0.8   ALKPHOS 46*   ALT 34   AST 61*   BILITOT 1.1*       ASSESSMENT/PLAN:     39-year-old female no significant past pain history admitted for acute hypoxic respiratory failure due to suspected COVID-19.    Active Hospital Problems    Diagnosis    *Pneumonia    Suspected Covid-19 Virus Infection      Pneumonia  Suspected COVID-19 with characteristic leukopenia ground-glass on imaging  Procal WNL, influenza neg  COVID-19 results pending  Empiric hydroxychloroquine  Airborne and contact precautions  G6PD pending  IV Rocephin and azithromycin  Atorvastatin  Daily procalcitonin, CRP  BCx NGTD  Continues to spike high fevers      VTE Risk Mitigation (From admission, onward)         Ordered     enoxaparin injection 40 mg  Daily      03/25/20 0128     IP VTE HIGH RISK PATIENT  Once      03/25/20 0128              Department Hospital Medicine  [facility]  Goyo Velazco MD  Date of service: 03/26/2020

## 2020-03-26 NOTE — SUBJECTIVE & OBJECTIVE
Interval History:  Patient continued to have fever past 24 hr, 102.1 yesterday at 5:00 p.m. on IV antibiotics and hydroxychloroquine for suspected COVID-19 infection.  Currently on 2 L nasal cannula saturating 95%.  Breathing comfortably on room air at 92%. Continues to be leukopenic.  CRP trending down.  Procalcitonin continues to be low.  Blood cultures continued to be no growth to date. Complains shortness of breath with ambulation.  Complains of body aches when she has fevers.  COVID-19 results pending    Review of Systems   Constitutional: Negative for chills, fatigue, fever and unexpected weight change.   HENT: Negative for sore throat.    Eyes: Negative for visual disturbance.   Respiratory: Negative for cough, chest tightness and shortness of breath.    Cardiovascular: Negative for chest pain.   Gastrointestinal: Negative for abdominal pain, constipation, diarrhea, nausea and vomiting.   Endocrine: Negative for polyuria.   Genitourinary: Negative for dysuria and hematuria.   Neurological: Negative for headaches.     Objective:     Vital Signs (Most Recent):  Temp: 100.1 °F (37.8 °C) (03/26/20 0337)  Pulse: 103 (03/26/20 0337)  Resp: 16 (03/26/20 0337)  BP: (!) 109/58 (03/26/20 0337)  SpO2: 95 % (03/26/20 0337) Vital Signs (24h Range):  Temp:  [99.2 °F (37.3 °C)-102.1 °F (38.9 °C)] 100.1 °F (37.8 °C)  Pulse:  [] 103  Resp:  [16-19] 16  SpO2:  [95 %-96 %] 95 %  BP: (108-131)/(54-78) 109/58     Weight: 117.9 kg (260 lb)  Body mass index is 39.53 kg/m².    Intake/Output Summary (Last 24 hours) at 3/26/2020 0833  Last data filed at 3/26/2020 0337  Gross per 24 hour   Intake 720 ml   Output 600 ml   Net 120 ml      Physical Exam   Constitutional: She is oriented to person, place, and time. She appears well-developed and well-nourished. No distress.   Fatigued appearing   HENT:   Head: Normocephalic.   Right Ear: External ear normal.   Left Ear: External ear normal.   Eyes: EOM are normal. Right eye exhibits  no discharge. Left eye exhibits no discharge. No scleral icterus.   Neck: Normal range of motion.   Cardiovascular: Normal rate and regular rhythm. Exam reveals no friction rub.   No murmur heard.  Pulmonary/Chest: Effort normal and breath sounds normal. No stridor. No respiratory distress. She has no wheezes.   Musculoskeletal: She exhibits no edema or deformity.   Neurological: She is alert and oriented to person, place, and time.   Skin: Skin is warm. She is not diaphoretic.   Psychiatric: She has a normal mood and affect. Her behavior is normal. Judgment and thought content normal.   Vitals reviewed.      Significant Labs:   CBC:   Recent Labs   Lab 03/25/20  0003 03/26/20  0535   WBC 3.42* 3.36*   HGB 12.5 11.3*   HCT 41.3 36.2*   * 214     CMP:   Recent Labs   Lab 03/24/20  2230 03/26/20  0535    139   K 3.6 2.8*    101   CO2 21* 26   * 107   BUN 5* 6   CREATININE 0.9 0.8   CALCIUM 8.2* 8.1*   PROT 7.8 7.4   ALBUMIN 3.3* 3.0*   BILITOT 1.5* 1.1*   ALKPHOS 50* 46*   AST 70* 61*   ALT 36 34   ANIONGAP 12 12   EGFRNONAA >60.0 >60.0     Recent Lab Results       03/26/20  0535        Procalcitonin 0.06  Comment:  Procalcitonin Result Interpretation:  <=0.50 ng/mL  Systemic infection (sepsis) is not likely. Local bacterial infection   is   possible.  >0.50 and <= 2.00 ng/mL  Systemic infection (sepsis) is possible, but other conditions are   also known   to elevate procalcitonin.   >2.00 ng/mL  Systemic infection (sepsis) is likely unless other causes are known.  >=10.00 ng/mL  Important systemic inflammatory response, almost exclusively due to   severe   bacterial sepsis or septic shock.       Albumin 3.0     Alkaline Phosphatase 46     ALT 34     Anion Gap 12     AST 61     Baso # 0.00     Basophil% 0.0     BILIRUBIN TOTAL 1.1  Comment:  For infants and newborns, interpretation of results should be based  on gestational age, weight and in agreement with clinical  observations.  Premature  Infant recommended reference ranges:  Up to 24 hours.............<8.0 mg/dL  Up to 48 hours............<12.0 mg/dL  3-5 days..................<15.0 mg/dL  6-29 days.................<15.0 mg/dL       BUN, Bld 6     Calcium 8.1     Chloride 101     CO2 26     Creatinine 0.8     CRP 2.34     Differential Method Automated     eGFR if African American >60.0     eGFR if non  >60.0  Comment:  Calculation used to obtain the estimated glomerular filtration  rate (eGFR) is the CKD-EPI equation.        Eos # 0.0     Eosinophil% 0.0     Glucose 107     Gran # (ANC) 2.0     Gran% 59.8     Hematocrit 36.2     Hemoglobin 11.3     Immature Grans (Abs) 0.01  Comment:  Mild elevation in immature granulocytes is non specific and   can be seen in a variety of conditions including stress response,   acute inflammation, trauma and pregnancy. Correlation with other   laboratory and clinical findings is essential.       Immature Granulocytes 0.3     Lymph # 1.2     Lymph% 35.1     MCH 25.8     MCHC 31.2     MCV 83     Mono # 0.2     Mono% 4.8     MPV 10.3     nRBC 0     Platelet Estimate Appears normal     Platelets 214  Comment:  Delta check review     Potassium 2.8  Comment:  K critical result(s) repeated. Called and verbal readback obtained   from Nurse Joan White, 1100 by [id number] 03/26/2020 06:38       PROTEIN TOTAL 7.4     RBC 4.38     RDW 15.4     Sodium 139     WBC 3.36

## 2020-03-27 PROBLEM — U07.1 ACUTE RESPIRATORY DISEASE DUE TO COVID-19 VIRUS: Status: ACTIVE | Noted: 2020-03-25

## 2020-03-27 PROBLEM — J06.9 ACUTE RESPIRATORY DISEASE DUE TO COVID-19 VIRUS: Status: ACTIVE | Noted: 2020-03-25

## 2020-03-27 LAB
ALBUMIN SERPL BCP-MCNC: 3.2 G/DL (ref 3.5–5.2)
ALP SERPL-CCNC: 45 U/L (ref 55–135)
ALT SERPL W/O P-5'-P-CCNC: 30 U/L (ref 10–44)
ANION GAP SERPL CALC-SCNC: 11 MMOL/L (ref 8–16)
AST SERPL-CCNC: 56 U/L (ref 10–40)
BASOPHILS # BLD AUTO: 0.01 K/UL (ref 0–0.2)
BASOPHILS NFR BLD: 0.2 % (ref 0–1.9)
BILIRUB SERPL-MCNC: 1.2 MG/DL (ref 0.1–1)
BUN SERPL-MCNC: 6 MG/DL (ref 6–20)
CALCIUM SERPL-MCNC: 8.1 MG/DL (ref 8.7–10.5)
CHLORIDE SERPL-SCNC: 99 MMOL/L (ref 95–110)
CO2 SERPL-SCNC: 28 MMOL/L (ref 23–29)
CREAT SERPL-MCNC: 0.9 MG/DL (ref 0.5–1.4)
CRP SERPL-MCNC: 2.53 MG/DL (ref 0–0.75)
DIFFERENTIAL METHOD: ABNORMAL
EOSINOPHIL # BLD AUTO: 0 K/UL (ref 0–0.5)
EOSINOPHIL NFR BLD: 0 % (ref 0–8)
ERYTHROCYTE [DISTWIDTH] IN BLOOD BY AUTOMATED COUNT: 15.2 % (ref 11.5–14.5)
EST. GFR  (AFRICAN AMERICAN): >60 ML/MIN/1.73 M^2
EST. GFR  (NON AFRICAN AMERICAN): >60 ML/MIN/1.73 M^2
GLUCOSE SERPL-MCNC: 103 MG/DL (ref 70–110)
HCT VFR BLD AUTO: 37.7 % (ref 37–48.5)
HGB BLD-MCNC: 11.6 G/DL (ref 12–16)
IMM GRANULOCYTES # BLD AUTO: 0.03 K/UL (ref 0–0.04)
IMM GRANULOCYTES NFR BLD AUTO: 0.6 % (ref 0–0.5)
LYMPHOCYTES # BLD AUTO: 1.5 K/UL (ref 1–4.8)
LYMPHOCYTES NFR BLD: 30.2 % (ref 18–48)
MAGNESIUM SERPL-MCNC: 1.9 MG/DL (ref 1.6–2.6)
MCH RBC QN AUTO: 25.4 PG (ref 27–31)
MCHC RBC AUTO-ENTMCNC: 30.8 G/DL (ref 32–36)
MCV RBC AUTO: 83 FL (ref 82–98)
MONOCYTES # BLD AUTO: 0.3 K/UL (ref 0.3–1)
MONOCYTES NFR BLD: 5.6 % (ref 4–15)
NEUTROPHILS # BLD AUTO: 3.1 K/UL (ref 1.8–7.7)
NEUTROPHILS NFR BLD: 63.4 % (ref 38–73)
NRBC BLD-RTO: 0 /100 WBC
PHOSPHATE SERPL-MCNC: 2.8 MG/DL (ref 2.7–4.5)
PLATELET # BLD AUTO: 231 K/UL (ref 150–350)
PMV BLD AUTO: 10 FL (ref 9.2–12.9)
POTASSIUM SERPL-SCNC: 3.1 MMOL/L (ref 3.5–5.1)
PROCALCITONIN SERPL IA-MCNC: <0.05 NG/ML (ref 0–0.5)
PROT SERPL-MCNC: 7.6 G/DL (ref 6–8.4)
RBC # BLD AUTO: 4.57 M/UL (ref 4–5.4)
SARS-COV-2 RNA RESP QL NAA+PROBE: DETECTED
SODIUM SERPL-SCNC: 138 MMOL/L (ref 136–145)
WBC # BLD AUTO: 4.84 K/UL (ref 3.9–12.7)

## 2020-03-27 PROCEDURE — 86140 C-REACTIVE PROTEIN: CPT

## 2020-03-27 PROCEDURE — 94640 AIRWAY INHALATION TREATMENT: CPT

## 2020-03-27 PROCEDURE — 84100 ASSAY OF PHOSPHORUS: CPT

## 2020-03-27 PROCEDURE — 80053 COMPREHEN METABOLIC PANEL: CPT

## 2020-03-27 PROCEDURE — 85025 COMPLETE CBC W/AUTO DIFF WBC: CPT

## 2020-03-27 PROCEDURE — 12000002 HC ACUTE/MED SURGE SEMI-PRIVATE ROOM

## 2020-03-27 PROCEDURE — 25000242 PHARM REV CODE 250 ALT 637 W/ HCPCS: Performed by: INTERNAL MEDICINE

## 2020-03-27 PROCEDURE — 25000003 PHARM REV CODE 250: Performed by: NURSE PRACTITIONER

## 2020-03-27 PROCEDURE — 25000003 PHARM REV CODE 250: Performed by: FAMILY MEDICINE

## 2020-03-27 PROCEDURE — 36415 COLL VENOUS BLD VENIPUNCTURE: CPT

## 2020-03-27 PROCEDURE — 63600175 PHARM REV CODE 636 W HCPCS: Performed by: NURSE PRACTITIONER

## 2020-03-27 PROCEDURE — 25000003 PHARM REV CODE 250: Performed by: INTERNAL MEDICINE

## 2020-03-27 PROCEDURE — 84145 PROCALCITONIN (PCT): CPT

## 2020-03-27 PROCEDURE — 63600175 PHARM REV CODE 636 W HCPCS: Performed by: INTERNAL MEDICINE

## 2020-03-27 PROCEDURE — 83735 ASSAY OF MAGNESIUM: CPT

## 2020-03-27 RX ORDER — ALBUTEROL SULFATE 90 UG/1
2 AEROSOL, METERED RESPIRATORY (INHALATION) EVERY 6 HOURS
Status: DISCONTINUED | OUTPATIENT
Start: 2020-03-27 | End: 2020-03-30

## 2020-03-27 RX ORDER — AZITHROMYCIN 250 MG/1
500 TABLET, FILM COATED ORAL
Status: DISCONTINUED | OUTPATIENT
Start: 2020-03-28 | End: 2020-03-29

## 2020-03-27 RX ADMIN — AZITHROMYCIN MONOHYDRATE 500 MG: 250 TABLET ORAL at 02:03

## 2020-03-27 RX ADMIN — ALBUTEROL SULFATE 2 PUFF: 90 AEROSOL, METERED RESPIRATORY (INHALATION) at 08:03

## 2020-03-27 RX ADMIN — CEFTRIAXONE 1 G: 1 INJECTION, SOLUTION INTRAVENOUS at 11:03

## 2020-03-27 RX ADMIN — ATORVASTATIN CALCIUM 40 MG: 40 TABLET, FILM COATED ORAL at 09:03

## 2020-03-27 RX ADMIN — GUAIFENESIN 600 MG: 600 TABLET, EXTENDED RELEASE ORAL at 09:03

## 2020-03-27 RX ADMIN — ENOXAPARIN SODIUM 40 MG: 100 INJECTION SUBCUTANEOUS at 05:03

## 2020-03-27 RX ADMIN — ACETAMINOPHEN 650 MG: 325 TABLET ORAL at 04:03

## 2020-03-27 RX ADMIN — POTASSIUM CHLORIDE 40 MEQ: 20 TABLET, EXTENDED RELEASE ORAL at 09:03

## 2020-03-27 RX ADMIN — HYDROXYCHLOROQUINE SULFATE 400 MG: 200 TABLET, FILM COATED ORAL at 09:03

## 2020-03-27 NOTE — CONSULTS
"Consult Note  Infectious Disease  THIS IS A REMOTE CONSULT DUE TO COVID PANDEMIC  INTERPROFRESSIONAL E/M    Reason for Consult:  COVID 19    HPI: Morelia Geiger is a  39 y.o. female : per H and P "39 year old female former smoker with no significant medical history. She presented to the ED with persistent moderate to severe nonproductive cough, associated with fever and mild-moderate shortness of breath for a week. She also reports poor appetite with mild-moderate nausea and some loose stools. She has mid-moderate mid chest pain with cough. She denies sick contacts or recent traveling. 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 She has required increase in oxygen requirement to 3.5 liters of oxygen  With sat 93% and  her CXR has worsened. Still reaching 101 degrees daily and WBC remain low, procal normal, BNP normal.  Chart reviewed and d/w Dr. Velazco.     Review of patient's allergies indicates:   Allergen Reactions    Sulfasalazine Itching     Past Medical History:   Diagnosis Date    Graves disease      Past Surgical History:   Procedure Laterality Date     SECTION  2014    WISDOM TOOTH EXTRACTION       Social History     Socioeconomic History    Marital status:      Spouse name: Not on file    Number of children: Not on file    Years of education: Not on file    Highest education level: Not on file   Occupational History    Not on file   Social Needs    Financial resource strain: Not on file    Food insecurity:     Worry: Not on file     Inability: Not on file    Transportation needs:     Medical: Not on file     Non-medical: Not on file   Tobacco Use    Smoking status: Former Smoker     Packs/day: 0.50     Years: 10.00     Pack years: 5.00     Types: Cigarettes     Start date: 2007    Smokeless tobacco: Never Used   Substance and Sexual Activity    Alcohol use: No    Drug use: No    Sexual " activity: Not on file   Lifestyle    Physical activity:     Days per week: Not on file     Minutes per session: Not on file    Stress: Not on file   Relationships    Social connections:     Talks on phone: Not on file     Gets together: Not on file     Attends Hoahaoism service: Not on file     Active member of club or organization: Not on file     Attends meetings of clubs or organizations: Not on file     Relationship status: Not on file   Other Topics Concern    Not on file   Social History Narrative    Lives at home with children- Works for [employer]    -she makes food choices at home     Family History   Problem Relation Age of Onset    Hypertension Mother     Hypertension Father     Heart disease Father     Kidney failure Father     Diabetes Father     Hyperlipidemia Father     Stroke Father     No Known Problems Sister     No Known Problems Brother     No Known Problems Daughter     Heart disease Maternal Grandmother     Cancer Maternal Grandfather     Cancer Paternal Grandfather         stomach    No Known Problems Brother     No Known Problems Brother     No Known Problems Daughter        Pertinent medications noted:     EXAM & DIAGNOSTICS REVIEWED:   Vitals:     Temp:  [99.3 °F (37.4 °C)-101.1 °F (38.4 °C)]   Temp: 100.3 °F (37.9 °C) (03/27/20 1538)  Pulse: 105 (03/27/20 1538)  Resp: 19 (03/27/20 1538)  BP: 110/72 (03/27/20 1538)  SpO2: 95 % (03/27/20 1538)    Intake/Output Summary (Last 24 hours) at 3/27/2020 1656  Last data filed at 3/26/2020 2300  Gross per 24 hour   Intake 460 ml   Output 350 ml   Net 110 ml        General Labs reviewed:  Recent Labs   Lab 03/25/20  0003 03/26/20  0535 03/27/20  0920   WBC 3.42* 3.36* 4.84   HGB 12.5 11.3* 11.6*   HCT 41.3 36.2* 37.7   * 214 231       Recent Labs   Lab 03/24/20  2230 03/26/20  0535 03/27/20  0920    139 138   K 3.6 2.8* 3.1*    101 99   CO2 21* 26 28   BUN 5* 6 6   CREATININE 0.9 0.8 0.9   CALCIUM 8.2* 8.1* 8.1*    PROT 7.8 7.4 7.6   BILITOT 1.5* 1.1* 1.2*   ALKPHOS 50* 46* 45*   ALT 36 34 30   AST 70* 61* 56*           Micro:  Microbiology Results (last 7 days)     Procedure Component Value Units Date/Time    Blood culture x two cultures. Draw prior to antibiotics. [[id number]] Collected:  03/24/20 2351    Order Status:  Completed Specimen:  Blood from Peripheral, Forearm, Left Updated:  03/27/20 0232     Blood Culture, Routine No Growth to date      No Growth to date      No Growth to date    Narrative:       Aerobic and anaerobic    Blood culture x two cultures. Draw prior to antibiotics. [[id number]] Collected:  03/24/20 2323    Order Status:  Completed Specimen:  Blood from Peripheral, Upper Arm, Right Updated:  03/27/20 0232     Blood Culture, Routine No Growth to date      No Growth to date      No Growth to date    Narrative:       Aerobic and anaerobic        Imaging Reviewed:   CXRs      Cardiology:    IMPRESSION & PLAN   1.   COVID pneumonitis  2. Hypoxemia, fever    Recommendations:  Continue zithromax daily  Continue HCQ at current dose  Consider adding albuterol MDI, done  Push pulmonary toilet,  Limit fluids IV  Check BNP tomorrow    Will follow remotely  30 min

## 2020-03-27 NOTE — PROGRESS NOTES
Progress Note  Patient Name: Morelia Geiger  MRN: [id number]  Admit Date: 3/24/2020   LOS: 2 days      SUBJECTIVE:     Principle problem: Suspected Covid-19 Virus Infection    Follow-up For:  Patient continued to have fever past 24 hr, 101.1 yesterday at 5:00 p.m. on IV antibiotics and hydroxychloroquine for suspected COVID-19 infection.  Currently on 2 L nasal cannula saturating 95%.  Breathing on room air at 88%.  CRP trending up.  Blood cultures continued to be no growth to date. Complains shortness of breath with ambulation and with coughing.  COVID-19 results pending    Scheduled Meds:   atorvastatin  40 mg Oral QHS    azithromycin  500 mg Oral Q24H    cefTRIAXone (ROCEPHIN) IVPB  1 g Intravenous Q24H    enoxaparin  40 mg Subcutaneous Daily    guaiFENesin  600 mg Oral BID    hydroxychloroquine  400 mg Oral Daily     Continuous Infusions:  PRN Meds:acetaminophen, acetaminophen, bisacodyL, calcium chloride IVPB, calcium chloride IVPB, calcium chloride IVPB, HYDROcodone-acetaminophen, hydrocodone-chlorpheniramine, loperamide, magnesium oxide, magnesium oxide, magnesium oxide, magnesium sulfate IVPB, magnesium sulfate IVPB, magnesium sulfate IVPB, magnesium sulfate IVPB, melatonin, ondansetron, potassium chloride in water, potassium chloride in water, potassium chloride in water, potassium chloride in water, potassium chloride 10%, potassium chloride 10%, potassium chloride 10%, potassium chloride, potassium chloride, potassium chloride, potassium chloride, potassium, sodium phosphates, potassium, sodium phosphates, potassium, sodium phosphates, promethazine (PHENERGAN) IVPB, sodium chloride 0.9%, sodium phosphate IVPB, sodium phosphate IVPB, sodium phosphate IVPB, sodium phosphate IVPB, sodium phosphate IVPB    Review of patient's allergies indicates:   Allergen Reactions    Sulfasalazine Itching       Review of Systems  As per subjective  Constitutional: Negative for chills, diaphoresis, fatigue, fever and  unexpected weight change.   HENT: Negative for congestion, ear pain, postnasal drip, rhinorrhea, sinus pressure, sneezing, sore throat, trouble swallowing and voice change.    Eyes: Negative for pain, discharge, itching and visual disturbance.   Respiratory: Negative for cough, chest tightness, shortness of breath, wheezing and stridor.    Cardiovascular: Negative for chest pain, palpitations and leg swelling.   Gastrointestinal: Negative for abdominal pain, blood in stool, constipation, diarrhea, nausea and vomiting.   Endocrine: Negative for cold intolerance, heat intolerance, polydipsia, polyphagia and polyuria.   Genitourinary: Negative for difficulty urinating, dysuria, flank pain, frequency, hematuria and urgency.   Musculoskeletal: Negative for arthralgias, back pain, joint swelling and myalgias.   Skin: Negative for pallor, rash and wound.   Neurological: Negative for dizziness, seizures, syncope, weakness, light-headedness and headaches.      OBJECTIVE:     Vital Signs (Most Recent)  Temp: 99.3 °F (37.4 °C) (03/27/20 0701)  Pulse: 105 (03/27/20 0701)  Resp: 20 (03/27/20 0701)  BP: (!) 102/52 (03/27/20 0701)  SpO2: 95 % (03/27/20 0701)    Vital Signs Range (Last 24H):  Temp:  [98.8 °F (37.1 °C)-101.1 °F (38.4 °C)]   Pulse:  []   Resp:  [18-20]   BP: (102-126)/(52-64)   SpO2:  [91 %-95 %]     I & O (Last 24H):    Intake/Output Summary (Last 24 hours) at 3/27/2020 1117  Last data filed at 3/26/2020 2300  Gross per 24 hour   Intake 460 ml   Output 350 ml   Net 110 ml     Physical Exam:  General: Patient resting comfortably in no acute distress. Appears as stated age. Calm. Fatigued appearing  Eyes: EOM intact. No conjunctivae injection. No scleral icterus.  ENT: Hearing grossly intact. No discharge from ears. No nasal discharge.   CVS: RRR. No LE edema BL.  Lungs: Crackles in lower lung bases BL. Good breath sounds. No accessory muscle use. No acute respiratory distress. NC  Neuro: AOx3. GCS 15. Cranial  nerves grossly intact. Moves all extremities equally. Follows commands. Responds appropriately     Laboratory:  BMP:   Recent Labs   Lab 03/27/20 0920         K 3.1*   CL 99   CO2 28   BUN 6   CREATININE 0.9   CALCIUM 8.1*   MG 1.9     CMP:   Recent Labs   Lab 03/27/20 0920      CALCIUM 8.1*   ALBUMIN 3.2*   PROT 7.6      K 3.1*   CO2 28   CL 99   BUN 6   CREATININE 0.9   ALKPHOS 45*   ALT 30   AST 56*   BILITOT 1.2*       X-Ray Chest 1 View [[id number]] Resulted: 03/27/20 0918   Order Status: Completed Updated: 03/27/20 0920   Narrative:     EXAMINATION:  XR CHEST 1 VIEW    CLINICAL HISTORY:  Shortness of breath, cough, fever.  Follow-up of abnormal chest x-ray sob;    TECHNIQUE:  Single frontal view of the chest is performed    COMPARISON:  Chest x-ray 03/24/2020    FINDINGS:  There has been mild increase in the interstitial and ground-glass opacities and question of development of small scattered airspace opacities in both lungs since the prior study of 03/24/2020.  Bandlike opacity in the left lower lung may represent subsegmental atelectasis    Heart is not enlarged.    There are no significant osseous abnormalities.   Impression:       Slight worsening of the bilateral lung opacities compared to 03/24/2020.  See above         ASSESSMENT/PLAN:     39-year-old female no significant past pain history admitted for acute hypoxic respiratory failure due to suspected COVID-19.    Active Hospital Problems    Diagnosis    *Suspected Covid-19 Virus Infection    Pneumonia      Pneumonia  Suspected COVID-19 with characteristic leukopenia ground-glass on imaging  Procal WNL, influenza neg  COVID-19 results pending  Empiric hydroxychloroquine, IV Rocephin and azithromycin  Airborne and contact precautions  G6PD WNL  Atorvastatin  Daily procalcitonin, CRP  BCx NGTD  Continues to spike high fevers  Imaging slightly worsened today and worsening saturations on room air      VTE Risk Mitigation (From  admission, onward)         Ordered     enoxaparin injection 40 mg  Daily      03/25/20 0128     IP VTE HIGH RISK PATIENT  Once      03/25/20 0128              Department Hospital Medicine  Formerly [facility]  Goyo Velazco MD  Date of service: 03/27/2020

## 2020-03-28 LAB
ALBUMIN SERPL BCP-MCNC: 3 G/DL (ref 3.5–5.2)
ALP SERPL-CCNC: 40 U/L (ref 55–135)
ALT SERPL W/O P-5'-P-CCNC: 27 U/L (ref 10–44)
ANION GAP SERPL CALC-SCNC: 12 MMOL/L (ref 8–16)
AST SERPL-CCNC: 61 U/L (ref 10–40)
BASOPHILS # BLD AUTO: 0 K/UL (ref 0–0.2)
BASOPHILS NFR BLD: 0 % (ref 0–1.9)
BILIRUB SERPL-MCNC: 0.9 MG/DL (ref 0.1–1)
BNP SERPL-MCNC: 6 PG/ML (ref 0–99)
BUN SERPL-MCNC: 7 MG/DL (ref 6–20)
CALCIUM SERPL-MCNC: 8.2 MG/DL (ref 8.7–10.5)
CHLORIDE SERPL-SCNC: 100 MMOL/L (ref 95–110)
CO2 SERPL-SCNC: 25 MMOL/L (ref 23–29)
CREAT SERPL-MCNC: 0.8 MG/DL (ref 0.5–1.4)
CRP SERPL-MCNC: 5.29 MG/DL (ref 0–0.75)
DIFFERENTIAL METHOD: ABNORMAL
EOSINOPHIL # BLD AUTO: 0 K/UL (ref 0–0.5)
EOSINOPHIL NFR BLD: 0 % (ref 0–8)
ERYTHROCYTE [DISTWIDTH] IN BLOOD BY AUTOMATED COUNT: 15 % (ref 11.5–14.5)
EST. GFR  (AFRICAN AMERICAN): >60 ML/MIN/1.73 M^2
EST. GFR  (NON AFRICAN AMERICAN): >60 ML/MIN/1.73 M^2
GLUCOSE SERPL-MCNC: 101 MG/DL (ref 70–110)
HCT VFR BLD AUTO: 33.3 % (ref 37–48.5)
HGB BLD-MCNC: 10.5 G/DL (ref 12–16)
IMM GRANULOCYTES # BLD AUTO: 0.02 K/UL (ref 0–0.04)
IMM GRANULOCYTES NFR BLD AUTO: 0.5 % (ref 0–0.5)
LYMPHOCYTES # BLD AUTO: 1.2 K/UL (ref 1–4.8)
LYMPHOCYTES NFR BLD: 28.9 % (ref 18–48)
MAGNESIUM SERPL-MCNC: 1.9 MG/DL (ref 1.6–2.6)
MCH RBC QN AUTO: 26 PG (ref 27–31)
MCHC RBC AUTO-ENTMCNC: 31.5 G/DL (ref 32–36)
MCV RBC AUTO: 82 FL (ref 82–98)
MONOCYTES # BLD AUTO: 0.2 K/UL (ref 0.3–1)
MONOCYTES NFR BLD: 4.5 % (ref 4–15)
NEUTROPHILS # BLD AUTO: 2.8 K/UL (ref 1.8–7.7)
NEUTROPHILS NFR BLD: 66.1 % (ref 38–73)
NRBC BLD-RTO: 0 /100 WBC
PHOSPHATE SERPL-MCNC: 2.8 MG/DL (ref 2.7–4.5)
PLATELET # BLD AUTO: 237 K/UL (ref 150–350)
PMV BLD AUTO: 10.7 FL (ref 9.2–12.9)
POTASSIUM SERPL-SCNC: 2.9 MMOL/L (ref 3.5–5.1)
PROCALCITONIN SERPL IA-MCNC: <0.05 NG/ML (ref 0–0.5)
PROT SERPL-MCNC: 7.3 G/DL (ref 6–8.4)
RBC # BLD AUTO: 4.04 M/UL (ref 4–5.4)
SODIUM SERPL-SCNC: 137 MMOL/L (ref 136–145)
TROPONIN I SERPL DL<=0.01 NG/ML-MCNC: <0.03 NG/ML
WBC # BLD AUTO: 4.22 K/UL (ref 3.9–12.7)

## 2020-03-28 PROCEDURE — 27000221 HC OXYGEN, UP TO 24 HOURS

## 2020-03-28 PROCEDURE — 86140 C-REACTIVE PROTEIN: CPT

## 2020-03-28 PROCEDURE — 63600175 PHARM REV CODE 636 W HCPCS: Performed by: INTERNAL MEDICINE

## 2020-03-28 PROCEDURE — 83735 ASSAY OF MAGNESIUM: CPT

## 2020-03-28 PROCEDURE — 25000003 PHARM REV CODE 250: Performed by: INTERNAL MEDICINE

## 2020-03-28 PROCEDURE — 25000003 PHARM REV CODE 250: Performed by: FAMILY MEDICINE

## 2020-03-28 PROCEDURE — 83880 ASSAY OF NATRIURETIC PEPTIDE: CPT

## 2020-03-28 PROCEDURE — 84100 ASSAY OF PHOSPHORUS: CPT

## 2020-03-28 PROCEDURE — 80053 COMPREHEN METABOLIC PANEL: CPT

## 2020-03-28 PROCEDURE — 36415 COLL VENOUS BLD VENIPUNCTURE: CPT

## 2020-03-28 PROCEDURE — 63600175 PHARM REV CODE 636 W HCPCS: Performed by: NURSE PRACTITIONER

## 2020-03-28 PROCEDURE — 25000003 PHARM REV CODE 250: Performed by: NURSE PRACTITIONER

## 2020-03-28 PROCEDURE — 84145 PROCALCITONIN (PCT): CPT

## 2020-03-28 PROCEDURE — 12000002 HC ACUTE/MED SURGE SEMI-PRIVATE ROOM

## 2020-03-28 PROCEDURE — 94640 AIRWAY INHALATION TREATMENT: CPT

## 2020-03-28 PROCEDURE — 94761 N-INVAS EAR/PLS OXIMETRY MLT: CPT

## 2020-03-28 PROCEDURE — 85025 COMPLETE CBC W/AUTO DIFF WBC: CPT

## 2020-03-28 PROCEDURE — 84484 ASSAY OF TROPONIN QUANT: CPT

## 2020-03-28 RX ORDER — LOPERAMIDE HYDROCHLORIDE 2 MG/1
2 CAPSULE ORAL EVERY 6 HOURS PRN
Qty: 30 CAPSULE | Refills: 0 | OUTPATIENT
Start: 2020-03-28 | End: 2020-04-04

## 2020-03-28 RX ORDER — ATORVASTATIN CALCIUM 40 MG/1
40 TABLET, FILM COATED ORAL NIGHTLY
Qty: 30 TABLET | Refills: 0 | OUTPATIENT
Start: 2020-03-28 | End: 2020-04-27

## 2020-03-28 RX ORDER — POTASSIUM CHLORIDE 20 MEQ/1
40 TABLET, EXTENDED RELEASE ORAL ONCE
Status: COMPLETED | OUTPATIENT
Start: 2020-03-28 | End: 2020-03-28

## 2020-03-28 RX ORDER — HYDROXYCHLOROQUINE SULFATE 200 MG/1
400 TABLET, FILM COATED ORAL DAILY
Qty: 6 TABLET | Refills: 0 | OUTPATIENT
Start: 2020-03-29 | End: 2020-04-01

## 2020-03-28 RX ORDER — POTASSIUM CHLORIDE 20 MEQ/1
20 TABLET, EXTENDED RELEASE ORAL 2 TIMES DAILY
Qty: 14 TABLET | Refills: 0 | OUTPATIENT
Start: 2020-03-28 | End: 2020-04-04

## 2020-03-28 RX ORDER — AZITHROMYCIN 250 MG/1
250 TABLET, FILM COATED ORAL DAILY
Qty: 3 TABLET | Refills: 0 | OUTPATIENT
Start: 2020-03-28 | End: 2020-03-31

## 2020-03-28 RX ADMIN — HYDROXYCHLOROQUINE SULFATE 400 MG: 200 TABLET, FILM COATED ORAL at 10:03

## 2020-03-28 RX ADMIN — HYDROCODONE BITARTRATE AND ACETAMINOPHEN 1 TABLET: 5; 325 TABLET ORAL at 02:03

## 2020-03-28 RX ADMIN — POTASSIUM CHLORIDE 40 MEQ: 20 TABLET, EXTENDED RELEASE ORAL at 10:03

## 2020-03-28 RX ADMIN — ALBUTEROL SULFATE 2 PUFF: 90 AEROSOL, METERED RESPIRATORY (INHALATION) at 07:03

## 2020-03-28 RX ADMIN — CEFTRIAXONE 1 G: 1 INJECTION, SOLUTION INTRAVENOUS at 11:03

## 2020-03-28 RX ADMIN — ALBUTEROL SULFATE 2 PUFF: 90 AEROSOL, METERED RESPIRATORY (INHALATION) at 01:03

## 2020-03-28 RX ADMIN — ATORVASTATIN CALCIUM 40 MG: 40 TABLET, FILM COATED ORAL at 09:03

## 2020-03-28 RX ADMIN — POTASSIUM CHLORIDE 40 MEQ: 20 TABLET, EXTENDED RELEASE ORAL at 02:03

## 2020-03-28 RX ADMIN — ACETAMINOPHEN 650 MG: 325 TABLET ORAL at 11:03

## 2020-03-28 RX ADMIN — HYDROCODONE POLISTIREX AND CHLORPHENIRAMINE POLISITREX 5 ML: 10; 8 SUSPENSION, EXTENDED RELEASE ORAL at 03:03

## 2020-03-28 RX ADMIN — GUAIFENESIN 600 MG: 600 TABLET, EXTENDED RELEASE ORAL at 10:03

## 2020-03-28 RX ADMIN — ACETAMINOPHEN 650 MG: 325 TABLET ORAL at 03:03

## 2020-03-28 RX ADMIN — AZITHROMYCIN 500 MG: 250 TABLET, FILM COATED ORAL at 10:03

## 2020-03-28 RX ADMIN — ENOXAPARIN SODIUM 40 MG: 100 INJECTION SUBCUTANEOUS at 05:03

## 2020-03-28 RX ADMIN — GUAIFENESIN 600 MG: 600 TABLET, EXTENDED RELEASE ORAL at 09:03

## 2020-03-28 NOTE — PLAN OF CARE
03/27/20 2040   Patient Assessment/Suction   Level of Consciousness (AVPU) alert   Respiratory Effort Unlabored   Expansion/Accessory Muscles/Retractions no use of accessory muscles   All Lung Fields Breath Sounds clear   Rhythm/Pattern, Respiratory unlabored   Cough Frequency no cough   PRE-TX-O2   O2 Device (Oxygen Therapy) room air   SpO2 95 %   Pulse 99   Resp 18   Inhaler   $ Inhaler Charges MDI (Metered Dose Inahler) Treatment   Daily Review of Necessity (Inhaler) completed   Respiratory Treatment Status (Inhaler) given   Treatment Route (Inhaler) mouthpiece   Patient Position (Inhaler) HOB elevated   Post Treatment Assessment (Inhaler) breath sounds improved   Signs of Intolerance (Inhaler) none   Breath Sounds Post-Respiratory Treatment   Throughout All Fields Post-Treatment All Fields   Throughout All Fields Post-Treatment aeration increased   Post-treatment Heart Rate (beats/min) 100   Post-treatment Resp Rate (breaths/min) 18

## 2020-03-28 NOTE — NURSING
Confirmed with Dr Velazco of potassium dose:    K 2.9    Per SC, gave 40meq this am with plans for 40meq this afternoon.     Per Dr Velazco, ok to follow the SC of 40meq this afternoon, do not give the new order of 40meq

## 2020-03-28 NOTE — PROGRESS NOTES
"Consult Note  Infectious Disease  THIS IS A REMOTE CONSULT DUE TO COVID PANDEMIC  INTERPROFRESSIONAL E/M    Reason for Consult:  COVID 19    HPI: Morelia Geiger is a  39 y.o. female : per H and P "39 year old female former smoker with no significant medical history. She presented to the ED with persistent moderate to severe nonproductive cough, associated with fever and mild-moderate shortness of breath for a week. She also reports poor appetite with mild-moderate nausea and some loose stools. She has mid-moderate mid chest pain with cough. She denies sick contacts or recent traveling. 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 She has required increase in oxygen requirement to 3.5 liters of oxygen  With sat 93% and  her CXR has worsened. Still reaching 101 degrees daily and WBC remain low, procal normal, BNP normal.  Chart reviewed and d/w Dr. Velazco.      3/28: INTERPROFRESSIONAL E/M, remote review.  Temps low grade. Sat 90% on 2 liters(was on 3.5 liters with sat 94% yesterday) hgb down 1g, renal and hepatic tests stable. CRP higher. resp notes reviewed. QTc 405 on monitor, K 2.9    flu test : 3/24 neg  Procalcitonin: 0.37, 0.05  COVID 19:  3/24 positive  G6PD: normal  QTC baseline:  441  QTC serial: 405  HIV Ab :  Troponin: normal, 0.03  BNP :  Hydroxychloroquine started : 3/24  Darunavir/CbST started  :  azithromycin started: 3/24  Remdesivir  started :       EXAM & DIAGNOSTICS REVIEWED:   Vitals:     Temp:  [98.8 °F (37.1 °C)-101.3 °F (38.5 °C)]   Temp: 99.4 °F (37.4 °C) (03/28/20 1015)  Pulse: 110 (03/28/20 1016)  Resp: 20 (03/28/20 1015)  BP: 132/83 (03/28/20 1015)  SpO2: (!) 90 % (03/28/20 1016)    Intake/Output Summary (Last 24 hours) at 3/28/2020 1134  Last data filed at 3/28/2020 0800  Gross per 24 hour   Intake 300 ml   Output --   Net 300 ml        General Labs reviewed:  Recent Labs   Lab 03/26/20  0535 03/27/20  0920 " 03/28/20  0628   WBC 3.36* 4.84 4.22   HGB 11.3* 11.6* 10.5*   HCT 36.2* 37.7 33.3*    231 237       Recent Labs   Lab 03/26/20  0535 03/27/20  0920 03/28/20  0628    138 137   K 2.8* 3.1* 2.9*    99 100   CO2 26 28 25   BUN 6 6 7   CREATININE 0.8 0.9 0.8   CALCIUM 8.1* 8.1* 8.2*   PROT 7.4 7.6 7.3   BILITOT 1.1* 1.2* 0.9   ALKPHOS 46* 45* 40*   ALT 34 30 27   AST 61* 56* 61*           Micro:  Microbiology Results (last 7 days)     Procedure Component Value Units Date/Time    Blood culture x two cultures. Draw prior to antibiotics. [[id number]] Collected:  03/24/20 2323    Order Status:  Completed Specimen:  Blood from Peripheral, Upper Arm, Right Updated:  03/28/20 0232     Blood Culture, Routine No Growth to date      No Growth to date      No Growth to date      No Growth to date    Narrative:       Aerobic and anaerobic    Blood culture x two cultures. Draw prior to antibiotics. [[id number]] Collected:  03/24/20 2351    Order Status:  Completed Specimen:  Blood from Peripheral, Forearm, Left Updated:  03/28/20 0232     Blood Culture, Routine No Growth to date      No Growth to date      No Growth to date      No Growth to date    Narrative:       Aerobic and anaerobic        Imaging Reviewed:   CXRs      Cardiology:    IMPRESSION & PLAN   1.   COVID pneumonitis  2. Hypoxemia, fever    Recommendations:  Continue zithromax daily  Continue HCQ    telemetry to monitor QTc  Needs more aggressive potassium repletion       Will follow remotely

## 2020-03-28 NOTE — PROGRESS NOTES
Progress Note  Patient Name: Morelia Geiger  MRN: [id number]  Admit Date: 3/24/2020   LOS: 3 days      SUBJECTIVE:     Principle problem: Acute respiratory disease due to COVID-19 virus    Interval history:  Patient spiked fever 101.3 this morning on IV antibiotics and hydroxychloroquine.  Patient was confirmed COVID positive yesterday. CRP trending up. Blood cultures continued to be no growth to date.  Patient breathing comfortably on room air and 94%.  Ambulated patient around the room and patient is saturating 94%.  Reports feeling weak but denies shortness of breath, chest pain.  Continues to have nonproductive coughing and developed shortness of breath with coughing.  QTc 383 today.      Scheduled Meds:   albuterol  2 puff Inhalation Q6H    atorvastatin  40 mg Oral QHS    azithromycin  500 mg Oral Q24H    cefTRIAXone (ROCEPHIN) IVPB  1 g Intravenous Q24H    enoxaparin  40 mg Subcutaneous Daily    guaiFENesin  600 mg Oral BID    hydroxychloroquine  400 mg Oral Daily     Continuous Infusions:  PRN Meds:acetaminophen, acetaminophen, bisacodyL, calcium chloride IVPB, calcium chloride IVPB, calcium chloride IVPB, HYDROcodone-acetaminophen, hydrocodone-chlorpheniramine, loperamide, magnesium oxide, magnesium oxide, magnesium oxide, magnesium sulfate IVPB, magnesium sulfate IVPB, magnesium sulfate IVPB, magnesium sulfate IVPB, melatonin, ondansetron, potassium chloride in water, potassium chloride in water, potassium chloride in water, potassium chloride in water, potassium chloride 10%, potassium chloride 10%, potassium chloride 10%, potassium chloride, potassium chloride, potassium chloride, potassium chloride, potassium, sodium phosphates, potassium, sodium phosphates, potassium, sodium phosphates, promethazine (PHENERGAN) IVPB, sodium chloride 0.9%, sodium phosphate IVPB, sodium phosphate IVPB, sodium phosphate IVPB, sodium phosphate IVPB, sodium phosphate IVPB    Review of patient's allergies indicates:    Allergen Reactions    Sulfasalazine Itching       Review of Systems  As per subjective  Constitutional: Negative for chills, diaphoresis, fatigue, fever and unexpected weight change.   HENT: Negative for congestion, ear pain, postnasal drip, rhinorrhea, sinus pressure, sneezing, sore throat, trouble swallowing and voice change.    Eyes: Negative for pain, discharge, itching and visual disturbance.   Respiratory: Negative for cough, chest tightness, shortness of breath, wheezing and stridor.    Cardiovascular: Negative for chest pain, palpitations and leg swelling.   Gastrointestinal: Negative for abdominal pain, blood in stool, constipation, diarrhea, nausea and vomiting.   Endocrine: Negative for cold intolerance, heat intolerance, polydipsia, polyphagia and polyuria.   Genitourinary: Negative for difficulty urinating, dysuria, flank pain, frequency, hematuria and urgency.   Musculoskeletal: Negative for arthralgias, back pain, joint swelling and myalgias.   Skin: Negative for pallor, rash and wound.   Neurological: Negative for dizziness, seizures, syncope, weakness, light-headedness and headaches.      OBJECTIVE:     Vital Signs (Most Recent)  Temp: 98.9 °F (37.2 °C) (03/28/20 0530)  Pulse: (!) 117 (03/28/20 0304)  Resp: 16 (03/28/20 0304)  BP: 111/67 (03/28/20 0304)  SpO2: (!) 94 % (03/28/20 0304)    Vital Signs Range (Last 24H):  Temp:  [98.8 °F (37.1 °C)-101.3 °F (38.5 °C)]   Pulse:  []   Resp:  [16-22]   BP: ()/(54-72)   SpO2:  [93 %-96 %]     I & O (Last 24H):  No intake or output data in the 24 hours ending 03/28/20 0920  Physical Exam:  General: Patient resting comfortably in no acute distress. Appears as stated age. Calm. Fatigued appearing  Eyes: EOM intact. No conjunctivae injection. No scleral icterus.  ENT: Hearing grossly intact. No discharge from ears. No nasal discharge.   CVS: RRR. No LE edema BL.  Lungs: Crackles in lower lung bases BL. Good breath sounds. No accessory muscle use.  No acute respiratory distress.  Neuro: AOx3. GCS 15. Cranial nerves grossly intact. Moves all extremities equally. Follows commands. Responds appropriately     Laboratory:  BMP:   Recent Labs   Lab 03/28/20  0628         K 2.9*      CO2 25   BUN 7   CREATININE 0.8   CALCIUM 8.2*   MG 1.9     CMP:   Recent Labs   Lab 03/28/20  0628      CALCIUM 8.2*   ALBUMIN 3.0*   PROT 7.3      K 2.9*   CO2 25      BUN 7   CREATININE 0.8   ALKPHOS 40*   ALT 27   AST 61*   BILITOT 0.9         ASSESSMENT/PLAN:     39-year-old female no significant past pain history admitted for acute hypoxic respiratory failure due to suspected COVID-19.    Active Hospital Problems    Diagnosis    *Acute respiratory disease due to COVID-19 virus    Pneumonia      Plan:  Procal WNL, influenza neg  COVID-19 positive  Empiric hydroxychloroquine, IV Rocephin and azithromycin  Airborne and contact precautions  G6PD WNL  Atorvastatin  Daily procalcitonin, CRP  BCx NGTD  Continues to spike high fevers      VTE Risk Mitigation (From admission, onward)         Ordered     enoxaparin injection 40 mg  Daily      03/25/20 0128     IP VTE HIGH RISK PATIENT  Once      03/25/20 0128              Department Hospital Medicine  Formerly [facility]  Goyo Velazco MD  Date of service: 03/28/2020

## 2020-03-28 NOTE — PLAN OF CARE
03/28/20 0755   Patient Assessment/Suction   Level of Consciousness (AVPU) alert   Respiratory Effort Normal;Unlabored   Expansion/Accessory Muscles/Retractions expansion symmetric;no retractions;no use of accessory muscles   All Lung Fields Breath Sounds clear   PRE-TX-O2   O2 Device (Oxygen Therapy) nasal cannula   $ Is the patient on Low Flow Oxygen? Yes   Flow (L/min) 2   SpO2 (!) 93 %   Pulse Oximetry Type Intermittent   $ Pulse Oximetry - Multiple Charge Pulse Oximetry - Multiple   Pulse 99   Resp 18   Inhaler   $ Inhaler Charges MDI (Metered Dose Inahler) Treatment   Daily Review of Necessity (Inhaler) completed   Respiratory Treatment Status (Inhaler) given   Treatment Route (Inhaler) mouthpiece   Patient Position (Inhaler) semi-Abernathy's   Post Treatment Assessment (Inhaler) breath sounds unchanged   Signs of Intolerance (Inhaler) none

## 2020-03-29 LAB
ALBUMIN SERPL BCP-MCNC: 3.5 G/DL (ref 3.5–5.2)
ALP SERPL-CCNC: 42 U/L (ref 55–135)
ALT SERPL W/O P-5'-P-CCNC: 28 U/L (ref 10–44)
ANION GAP SERPL CALC-SCNC: 14 MMOL/L (ref 8–16)
AST SERPL-CCNC: 66 U/L (ref 10–40)
BASOPHILS # BLD AUTO: 0.01 K/UL (ref 0–0.2)
BASOPHILS NFR BLD: 0.2 % (ref 0–1.9)
BILIRUB SERPL-MCNC: 1 MG/DL (ref 0.1–1)
BUN SERPL-MCNC: 9 MG/DL (ref 6–20)
CALCIUM SERPL-MCNC: 8.7 MG/DL (ref 8.7–10.5)
CHLORIDE SERPL-SCNC: 99 MMOL/L (ref 95–110)
CO2 SERPL-SCNC: 25 MMOL/L (ref 23–29)
CREAT SERPL-MCNC: 0.9 MG/DL (ref 0.5–1.4)
CRP SERPL-MCNC: 7.13 MG/DL (ref 0–0.75)
DIFFERENTIAL METHOD: ABNORMAL
EOSINOPHIL # BLD AUTO: 0 K/UL (ref 0–0.5)
EOSINOPHIL NFR BLD: 0 % (ref 0–8)
ERYTHROCYTE [DISTWIDTH] IN BLOOD BY AUTOMATED COUNT: 15.3 % (ref 11.5–14.5)
EST. GFR  (AFRICAN AMERICAN): >60 ML/MIN/1.73 M^2
EST. GFR  (NON AFRICAN AMERICAN): >60 ML/MIN/1.73 M^2
GLUCOSE SERPL-MCNC: 90 MG/DL (ref 70–110)
HCT VFR BLD AUTO: 40.4 % (ref 37–48.5)
HGB BLD-MCNC: 12.2 G/DL (ref 12–16)
IMM GRANULOCYTES # BLD AUTO: 0.02 K/UL (ref 0–0.04)
IMM GRANULOCYTES NFR BLD AUTO: 0.4 % (ref 0–0.5)
LYMPHOCYTES # BLD AUTO: 1.2 K/UL (ref 1–4.8)
LYMPHOCYTES NFR BLD: 26.5 % (ref 18–48)
MAGNESIUM SERPL-MCNC: 2.2 MG/DL (ref 1.6–2.6)
MCH RBC QN AUTO: 25.2 PG (ref 27–31)
MCHC RBC AUTO-ENTMCNC: 30.2 G/DL (ref 32–36)
MCV RBC AUTO: 83 FL (ref 82–98)
MONOCYTES # BLD AUTO: 0.3 K/UL (ref 0.3–1)
MONOCYTES NFR BLD: 5.5 % (ref 4–15)
NEUTROPHILS # BLD AUTO: 3.1 K/UL (ref 1.8–7.7)
NEUTROPHILS NFR BLD: 67.4 % (ref 38–73)
NRBC BLD-RTO: 0 /100 WBC
PHOSPHATE SERPL-MCNC: 3.5 MG/DL (ref 2.7–4.5)
PLATELET # BLD AUTO: 259 K/UL (ref 150–350)
PMV BLD AUTO: 10.8 FL (ref 9.2–12.9)
POTASSIUM SERPL-SCNC: 3.3 MMOL/L (ref 3.5–5.1)
PROCALCITONIN SERPL IA-MCNC: <0.05 NG/ML (ref 0–0.5)
PROT SERPL-MCNC: 8.8 G/DL (ref 6–8.4)
RBC # BLD AUTO: 4.85 M/UL (ref 4–5.4)
SODIUM SERPL-SCNC: 138 MMOL/L (ref 136–145)
TROPONIN I SERPL DL<=0.01 NG/ML-MCNC: <0.03 NG/ML
WBC # BLD AUTO: 4.53 K/UL (ref 3.9–12.7)

## 2020-03-29 PROCEDURE — 25000003 PHARM REV CODE 250: Performed by: INTERNAL MEDICINE

## 2020-03-29 PROCEDURE — 94761 N-INVAS EAR/PLS OXIMETRY MLT: CPT

## 2020-03-29 PROCEDURE — 12000002 HC ACUTE/MED SURGE SEMI-PRIVATE ROOM

## 2020-03-29 PROCEDURE — 25000003 PHARM REV CODE 250: Performed by: FAMILY MEDICINE

## 2020-03-29 PROCEDURE — 85025 COMPLETE CBC W/AUTO DIFF WBC: CPT

## 2020-03-29 PROCEDURE — 36415 COLL VENOUS BLD VENIPUNCTURE: CPT

## 2020-03-29 PROCEDURE — 84100 ASSAY OF PHOSPHORUS: CPT

## 2020-03-29 PROCEDURE — 27000221 HC OXYGEN, UP TO 24 HOURS

## 2020-03-29 PROCEDURE — 25000003 PHARM REV CODE 250: Performed by: NURSE PRACTITIONER

## 2020-03-29 PROCEDURE — 63600175 PHARM REV CODE 636 W HCPCS: Performed by: INTERNAL MEDICINE

## 2020-03-29 PROCEDURE — 94640 AIRWAY INHALATION TREATMENT: CPT

## 2020-03-29 PROCEDURE — 80053 COMPREHEN METABOLIC PANEL: CPT

## 2020-03-29 PROCEDURE — 84145 PROCALCITONIN (PCT): CPT

## 2020-03-29 PROCEDURE — 83735 ASSAY OF MAGNESIUM: CPT

## 2020-03-29 PROCEDURE — 84484 ASSAY OF TROPONIN QUANT: CPT

## 2020-03-29 PROCEDURE — 86140 C-REACTIVE PROTEIN: CPT

## 2020-03-29 PROCEDURE — 63600175 PHARM REV CODE 636 W HCPCS: Performed by: NURSE PRACTITIONER

## 2020-03-29 RX ORDER — ACETAMINOPHEN 325 MG/1
650 TABLET ORAL EVERY 6 HOURS
Status: DISCONTINUED | OUTPATIENT
Start: 2020-03-29 | End: 2020-04-01

## 2020-03-29 RX ORDER — HYDROXYZINE PAMOATE 25 MG/1
50 CAPSULE ORAL ONCE
Status: COMPLETED | OUTPATIENT
Start: 2020-03-29 | End: 2020-03-29

## 2020-03-29 RX ORDER — HYDROXYZINE PAMOATE 25 MG/1
50 CAPSULE ORAL EVERY 6 HOURS PRN
Status: DISCONTINUED | OUTPATIENT
Start: 2020-03-29 | End: 2020-04-03 | Stop reason: HOSPADM

## 2020-03-29 RX ORDER — AZITHROMYCIN 250 MG/1
250 TABLET, FILM COATED ORAL
Status: COMPLETED | OUTPATIENT
Start: 2020-03-30 | End: 2020-03-31

## 2020-03-29 RX ADMIN — HYDROCODONE POLISTIREX AND CHLORPHENIRAMINE POLISITREX 5 ML: 10; 8 SUSPENSION, EXTENDED RELEASE ORAL at 12:03

## 2020-03-29 RX ADMIN — ACETAMINOPHEN 650 MG: 325 TABLET ORAL at 09:03

## 2020-03-29 RX ADMIN — HYDROXYZINE PAMOATE 50 MG: 25 CAPSULE ORAL at 03:03

## 2020-03-29 RX ADMIN — ALBUTEROL SULFATE 2 PUFF: 90 AEROSOL, METERED RESPIRATORY (INHALATION) at 01:03

## 2020-03-29 RX ADMIN — ATORVASTATIN CALCIUM 40 MG: 40 TABLET, FILM COATED ORAL at 08:03

## 2020-03-29 RX ADMIN — HYDROXYCHLOROQUINE SULFATE 400 MG: 200 TABLET, FILM COATED ORAL at 09:03

## 2020-03-29 RX ADMIN — ALBUTEROL SULFATE 2 PUFF: 90 AEROSOL, METERED RESPIRATORY (INHALATION) at 07:03

## 2020-03-29 RX ADMIN — LOPERAMIDE HYDROCHLORIDE 2 MG: 2 CAPSULE ORAL at 09:03

## 2020-03-29 RX ADMIN — GUAIFENESIN 600 MG: 600 TABLET, EXTENDED RELEASE ORAL at 08:03

## 2020-03-29 RX ADMIN — ACETAMINOPHEN 650 MG: 325 TABLET ORAL at 11:03

## 2020-03-29 RX ADMIN — HYDROCODONE POLISTIREX AND CHLORPHENIRAMINE POLISITREX 5 ML: 10; 8 SUSPENSION, EXTENDED RELEASE ORAL at 11:03

## 2020-03-29 RX ADMIN — ACETAMINOPHEN 650 MG: 325 TABLET ORAL at 03:03

## 2020-03-29 RX ADMIN — POTASSIUM CHLORIDE 40 MEQ: 20 TABLET, EXTENDED RELEASE ORAL at 12:03

## 2020-03-29 RX ADMIN — ENOXAPARIN SODIUM 40 MG: 100 INJECTION SUBCUTANEOUS at 04:03

## 2020-03-29 RX ADMIN — AZITHROMYCIN 500 MG: 250 TABLET, FILM COATED ORAL at 09:03

## 2020-03-29 RX ADMIN — GUAIFENESIN 600 MG: 600 TABLET, EXTENDED RELEASE ORAL at 09:03

## 2020-03-29 RX ADMIN — CEFTRIAXONE 1 G: 1 INJECTION, SOLUTION INTRAVENOUS at 11:03

## 2020-03-29 NOTE — PLAN OF CARE
03/28/20 1938   Patient Assessment/Suction   Level of Consciousness (AVPU) alert   Respiratory Effort Unlabored   Expansion/Accessory Muscles/Retractions no use of accessory muscles   All Lung Fields Breath Sounds wheezes, expiratory   Rhythm/Pattern, Respiratory unlabored   Cough Frequency infrequent   Cough Type nonproductive   PRE-TX-O2   O2 Device (Oxygen Therapy) nasal cannula   Flow (L/min) 2   SpO2 96 %   Pulse 105   Resp 18   Inhaler   $ Inhaler Charges MDI (Metered Dose Inahler) Treatment;Spacer - Equipment   Daily Review of Necessity (Inhaler) completed   Respiratory Treatment Status (Inhaler) given   Treatment Route (Inhaler) spacer/holding chamber   Patient Position (Inhaler) HOB elevated   Post Treatment Assessment (Inhaler) breath sounds improved   Signs of Intolerance (Inhaler) none   Breath Sounds Post-Respiratory Treatment   Throughout All Fields Post-Treatment All Fields   Throughout All Fields Post-Treatment clear   Post-treatment Heart Rate (beats/min) 106   Post-treatment Resp Rate (breaths/min) 18

## 2020-03-29 NOTE — PLAN OF CARE
Problem: Adult Inpatient Plan of Care  Goal: Plan of Care Review  Outcome: Ongoing, Progressing  Goal: Patient-Specific Goal (Individualization)  Outcome: Ongoing, Progressing  Goal: Absence of Hospital-Acquired Illness or Injury  Outcome: Ongoing, Progressing  Goal: Optimal Comfort and Wellbeing  Outcome: Ongoing, Progressing  Goal: Readiness for Transition of Care  Outcome: Ongoing, Progressing  Goal: Rounds/Family Conference  Outcome: Ongoing, Progressing     Problem: Fall Injury Risk  Goal: Absence of Fall and Fall-Related Injury  Outcome: Ongoing, Progressing     Problem: Fluid Imbalance (Pneumonia)  Goal: Fluid Balance  Outcome: Ongoing, Progressing     Problem: Infection (Pneumonia)  Goal: Resolution of Infection Signs/Symptoms  Outcome: Ongoing, Progressing     Problem: Respiratory Compromise (Pneumonia)  Goal: Effective Oxygenation and Ventilation  Outcome: Ongoing, Progressing

## 2020-03-29 NOTE — PROGRESS NOTES
Progress Note  Patient Name: Morelia Geiger  MRN: [id number]  Admit Date: 3/24/2020   LOS: 4 days      SUBJECTIVE:     Principle problem: Acute respiratory disease due to COVID-19 virus    Interval history:  Continues to have spikes fevers 101.2 and 9:00 a.m. on IV antibiotics and hydroxychloroquine.  Continues to complain of shortness of breath and persistent coughing.  Has concerns and wants to be intubated.  Counseled patient on patient's improved condition and no necessity for intubation.  CRP trending up today.  Procalcitonin continues to be low. QTc 390 today.  Encourage patient to sit up in chair.    Scheduled Meds:   albuterol  2 puff Inhalation Q6H    atorvastatin  40 mg Oral QHS    azithromycin  500 mg Oral Q24H    cefTRIAXone (ROCEPHIN) IVPB  1 g Intravenous Q24H    enoxaparin  40 mg Subcutaneous Daily    guaiFENesin  600 mg Oral BID     Continuous Infusions:  PRN Meds:acetaminophen, acetaminophen, bisacodyL, calcium chloride IVPB, calcium chloride IVPB, calcium chloride IVPB, HYDROcodone-acetaminophen, hydrocodone-chlorpheniramine, loperamide, magnesium oxide, magnesium oxide, magnesium oxide, magnesium sulfate IVPB, magnesium sulfate IVPB, magnesium sulfate IVPB, magnesium sulfate IVPB, melatonin, ondansetron, potassium chloride in water, potassium chloride in water, potassium chloride in water, potassium chloride in water, potassium chloride 10%, potassium chloride 10%, potassium chloride 10%, potassium chloride, potassium chloride, potassium chloride, potassium chloride, potassium, sodium phosphates, potassium, sodium phosphates, potassium, sodium phosphates, promethazine (PHENERGAN) IVPB, sodium chloride 0.9%, sodium phosphate IVPB, sodium phosphate IVPB, sodium phosphate IVPB, sodium phosphate IVPB, sodium phosphate IVPB    Review of patient's allergies indicates:   Allergen Reactions    Sulfasalazine Itching       Review of Systems  As per subjective  Constitutional: Negative for chills,  diaphoresis, fatigue, fever and unexpected weight change.   HENT: Negative for congestion, ear pain, postnasal drip, rhinorrhea, sinus pressure, sneezing, sore throat, trouble swallowing and voice change.    Eyes: Negative for pain, discharge, itching and visual disturbance.   Respiratory: Negative for cough, chest tightness, shortness of breath, wheezing and stridor.    Cardiovascular: Negative for chest pain, palpitations and leg swelling.   Gastrointestinal: Negative for abdominal pain, blood in stool, constipation, diarrhea, nausea and vomiting.   Endocrine: Negative for cold intolerance, heat intolerance, polydipsia, polyphagia and polyuria.   Genitourinary: Negative for difficulty urinating, dysuria, flank pain, frequency, hematuria and urgency.   Musculoskeletal: Negative for arthralgias, back pain, joint swelling and myalgias.   Skin: Negative for pallor, rash and wound.   Neurological: Negative for dizziness, seizures, syncope, weakness, light-headedness and headaches.      OBJECTIVE:     Vital Signs (Most Recent)  Temp: (!) 100.4 °F (38 °C) (03/29/20 1133)  Pulse: 71 (03/29/20 1133)  Resp: 16 (03/29/20 1133)  BP: 98/64 (03/29/20 1133)  SpO2: 98 % (03/29/20 1133)    Vital Signs Range (Last 24H):  Temp:  [98.6 °F (37 °C)-101.2 °F (38.4 °C)]   Pulse:  []   Resp:  [15-20]   BP: ()/(60-94)   SpO2:  [90 %-98 %]     I & O (Last 24H):    Intake/Output Summary (Last 24 hours) at 3/29/2020 1141  Last data filed at 3/29/2020 0800  Gross per 24 hour   Intake 300 ml   Output --   Net 300 ml     Physical Exam:  General: Patient resting comfortably in no acute distress. Appears as stated age. Calm. Fatigued appearing  Eyes: EOM intact. No conjunctivae injection. No scleral icterus.  ENT: Hearing grossly intact. No discharge from ears. No nasal discharge.   CVS: RRR. No LE edema BL.  Lungs: Crackles in lower lung bases BL. Good breath sounds. No accessory muscle use. No acute respiratory distress.  Neuro:  AOx3. GCS 15. Cranial nerves grossly intact. Moves all extremities equally. Follows commands. Responds appropriately     Laboratory:  BMP:   Recent Labs   Lab 03/29/20  0606   GLU 90      K 3.3*   CL 99   CO2 25   BUN 9   CREATININE 0.9   CALCIUM 8.7   MG 2.2     CMP:   Recent Labs   Lab 03/29/20  0606   GLU 90   CALCIUM 8.7   ALBUMIN 3.5   PROT 8.8*      K 3.3*   CO2 25   CL 99   BUN 9   CREATININE 0.9   ALKPHOS 42*   ALT 28   AST 66*   BILITOT 1.0         ASSESSMENT/PLAN:     39-year-old female no significant past pain history admitted for acute hypoxic respiratory failure due to COVID-19.    Active Hospital Problems    Diagnosis    *Acute respiratory disease due to COVID-19 virus    Pneumonia      Plan:  Procal WNL, influenza neg  COVID-19 positive  Empiric hydroxychloroquine, IV Rocephin and azithromycin  Airborne and contact precautions  G6PD WNL  Atorvastatin  Daily procalcitonin, CRP  BCx NGTD  Continues to spike high fevers      VTE Risk Mitigation (From admission, onward)         Ordered     enoxaparin injection 40 mg  Daily      03/25/20 0128     IP VTE HIGH RISK PATIENT  Once      03/25/20 0128              Department Hospital Medicine  [facility]  Goyo Velazco MD  Date of service: 03/29/2020

## 2020-03-29 NOTE — NURSING
"Patient in bed, lying flat, lights off, and states she is "resting".      Lights turned on, patient compliant with sitting at side of bed. I explained she needed to sit up some during the day. Prepared side chair for her sit in. She chose to sit on side of bed at this time. I encouraged her to eat lunch.      "

## 2020-03-29 NOTE — NURSING
Patient spent several hours sitting on bedside chair watching TV. Patients spouse sent dinner, which patient is slowly eating.     Patient without c/o nor s/s of distress

## 2020-03-29 NOTE — PLAN OF CARE
03/29/20 0751   Patient Assessment/Suction   Level of Consciousness (AVPU) alert   All Lung Fields Breath Sounds wheezes, expiratory   PRE-TX-O2   O2 Device (Oxygen Therapy) nasal cannula   $ Is the patient on Low Flow Oxygen? Yes   Flow (L/min) 2   SpO2 98 %   Pulse Oximetry Type Intermittent   $ Pulse Oximetry - Multiple Charge Pulse Oximetry - Multiple   Pulse (!) 59   Resp 15   Inhaler   $ Inhaler Charges MDI (Metered Dose Inahler) Treatment   Daily Review of Necessity (Inhaler) completed   Respiratory Treatment Status (Inhaler) given   Treatment Route (Inhaler) mouthpiece;spacer/holding chamber   Patient Position (Inhaler) semi-Abernathy's   Post Treatment Assessment (Inhaler) breath sounds improved;wheezing decreased   Signs of Intolerance (Inhaler) none   Breath Sounds Post-Respiratory Treatment   Post-treatment Heart Rate (beats/min) 59   Post-treatment Resp Rate (breaths/min) 15

## 2020-03-29 NOTE — NURSING
"Elevated temp and VS. Dr Velazco notified    Patient verbalized concern of her respiratory status, asking "when do you need to go on a vent?"    Teaching done.    "

## 2020-03-29 NOTE — PROGRESS NOTES
"Consult Note  Infectious Disease  THIS IS A REMOTE CONSULT DUE TO COVID PANDEMIC  INTERPROFRESSIONAL E/M    Reason for Consult:  COVID 19    HPI: Morelia Geiger is a  39 y.o. female : per H and P "39 year old female former smoker with no significant medical history. She presented to the ED with persistent moderate to severe nonproductive cough, associated with fever and mild-moderate shortness of breath for a week. She also reports poor appetite with mild-moderate nausea and some loose stools. She has mid-moderate mid chest pain with cough. She denies sick contacts or recent traveling. 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 She has required increase in oxygen requirement to 3.5 liters of oxygen  With sat 93% and  her CXR has worsened. Still reaching 101 degrees daily and WBC remain low, procal normal, BNP normal.  Chart reviewed and d/w Dr. Velazco.      3/28: INTERPROFRESSIONAL E/M, remote review.  Temps low grade. Sat 90% on 2 liters(was on 3.5 liters with sat 94% yesterday) hgb down 1g, renal and hepatic tests stable. CRP higher. resp notes reviewed. QTc 405 on monitor, K 2.9  3/29: temps still hectic. Overall labs are stable, though CRP is a little higher.  oxygen needs are stable    flu test : 3/24 neg  Procalcitonin: 0.37, 0.05, 0.05  COVID 19:  3/24 positive  G6PD: normal  QTC baseline:  441  QTC serial: 405, 425  HIV Ab :  Troponin: normal, 0.03  BNP :  Hydroxychloroquine started : 3/24  Darunavir/CbST started  :  azithromycin started: 3/24  Remdesivir  started :       EXAM & DIAGNOSTICS REVIEWED:   Vitals:     Temp:  [98.6 °F (37 °C)-101.2 °F (38.4 °C)]   Temp: (!) 100.4 °F (38 °C) (03/29/20 1133)  Pulse: (!) 15 (03/29/20 1315)  Resp: 15 (03/29/20 1300)  BP: 98/64 (03/29/20 1133)  SpO2: 97 % (03/29/20 1315)    Intake/Output Summary (Last 24 hours) at 3/29/2020 1358  Last data filed at 3/29/2020 0800  Gross per 24 hour   Intake " 300 ml   Output --   Net 300 ml        General Labs reviewed:  Recent Labs   Lab 03/27/20  0920 03/28/20  0628 03/29/20  0606   WBC 4.84 4.22 4.53   HGB 11.6* 10.5* 12.2   HCT 37.7 33.3* 40.4    237 259       Recent Labs   Lab 03/27/20  0920 03/28/20  0628 03/29/20  0606    137 138   K 3.1* 2.9* 3.3*   CL 99 100 99   CO2 28 25 25   BUN 6 7 9   CREATININE 0.9 0.8 0.9   CALCIUM 8.1* 8.2* 8.7   PROT 7.6 7.3 8.8*   BILITOT 1.2* 0.9 1.0   ALKPHOS 45* 40* 42*   ALT 30 27 28   AST 56* 61* 66*           Micro:  Microbiology Results (last 7 days)     Procedure Component Value Units Date/Time    Blood culture x two cultures. Draw prior to antibiotics. [[id number]] Collected:  03/24/20 2351    Order Status:  Completed Specimen:  Blood from Peripheral, Forearm, Left Updated:  03/29/20 0232     Blood Culture, Routine No Growth to date      No Growth to date      No Growth to date      No Growth to date      No Growth to date    Narrative:       Aerobic and anaerobic    Blood culture x two cultures. Draw prior to antibiotics. [[id number]] Collected:  03/24/20 2323    Order Status:  Completed Specimen:  Blood from Peripheral, Upper Arm, Right Updated:  03/29/20 0232     Blood Culture, Routine No Growth to date      No Growth to date      No Growth to date      No Growth to date      No Growth to date    Narrative:       Aerobic and anaerobic        Imaging Reviewed:   CXRs      Cardiology:    IMPRESSION & PLAN   1.   COVID pneumonitis  2. Hypoxemia, fever    Recommendations:  Continue zithromax daily (250 mg )  Continue HCQ   Consider anxiolytic and scheduled tylenol  Repeat ferritin   telemetry to monitor QTc        Will follow remotely

## 2020-03-30 LAB
ALBUMIN SERPL BCP-MCNC: 3.1 G/DL (ref 3.5–5.2)
ALP SERPL-CCNC: 33 U/L (ref 55–135)
ALT SERPL W/O P-5'-P-CCNC: 21 U/L (ref 10–44)
ANION GAP SERPL CALC-SCNC: 12 MMOL/L (ref 8–16)
AST SERPL-CCNC: 44 U/L (ref 10–40)
BACTERIA BLD CULT: NORMAL
BACTERIA BLD CULT: NORMAL
BASOPHILS # BLD AUTO: 0.01 K/UL (ref 0–0.2)
BASOPHILS NFR BLD: 0.1 % (ref 0–1.9)
BILIRUB SERPL-MCNC: 1 MG/DL (ref 0.1–1)
BUN SERPL-MCNC: 11 MG/DL (ref 6–20)
CALCIUM SERPL-MCNC: 8.2 MG/DL (ref 8.7–10.5)
CHLORIDE SERPL-SCNC: 102 MMOL/L (ref 95–110)
CO2 SERPL-SCNC: 22 MMOL/L (ref 23–29)
CREAT SERPL-MCNC: 0.9 MG/DL (ref 0.5–1.4)
CRP SERPL-MCNC: 6.95 MG/DL (ref 0–0.75)
DIFFERENTIAL METHOD: ABNORMAL
EOSINOPHIL # BLD AUTO: 0 K/UL (ref 0–0.5)
EOSINOPHIL NFR BLD: 0 % (ref 0–8)
ERYTHROCYTE [DISTWIDTH] IN BLOOD BY AUTOMATED COUNT: 15.2 % (ref 11.5–14.5)
EST. GFR  (AFRICAN AMERICAN): >60 ML/MIN/1.73 M^2
EST. GFR  (NON AFRICAN AMERICAN): >60 ML/MIN/1.73 M^2
FERRITIN SERPL-MCNC: 292 NG/ML (ref 20–300)
GLUCOSE SERPL-MCNC: 99 MG/DL (ref 70–110)
HCT VFR BLD AUTO: 33.4 % (ref 37–48.5)
HGB BLD-MCNC: 10.4 G/DL (ref 12–16)
IMM GRANULOCYTES # BLD AUTO: 0.04 K/UL (ref 0–0.04)
IMM GRANULOCYTES NFR BLD AUTO: 0.6 % (ref 0–0.5)
LYMPHOCYTES # BLD AUTO: 1.2 K/UL (ref 1–4.8)
LYMPHOCYTES NFR BLD: 18.2 % (ref 18–48)
MAGNESIUM SERPL-MCNC: 2.4 MG/DL (ref 1.6–2.6)
MCH RBC QN AUTO: 25.5 PG (ref 27–31)
MCHC RBC AUTO-ENTMCNC: 31.1 G/DL (ref 32–36)
MCV RBC AUTO: 82 FL (ref 82–98)
MONOCYTES # BLD AUTO: 0.3 K/UL (ref 0.3–1)
MONOCYTES NFR BLD: 4.2 % (ref 4–15)
NEUTROPHILS # BLD AUTO: 5.3 K/UL (ref 1.8–7.7)
NEUTROPHILS NFR BLD: 76.9 % (ref 38–73)
NRBC BLD-RTO: 0 /100 WBC
PHOSPHATE SERPL-MCNC: 3.1 MG/DL (ref 2.7–4.5)
PLATELET # BLD AUTO: 318 K/UL (ref 150–350)
PMV BLD AUTO: 10.4 FL (ref 9.2–12.9)
POTASSIUM SERPL-SCNC: 3.4 MMOL/L (ref 3.5–5.1)
PROCALCITONIN SERPL IA-MCNC: <0.05 NG/ML (ref 0–0.5)
PROT SERPL-MCNC: 7.5 G/DL (ref 6–8.4)
RBC # BLD AUTO: 4.08 M/UL (ref 4–5.4)
SODIUM SERPL-SCNC: 136 MMOL/L (ref 136–145)
TROPONIN I SERPL DL<=0.01 NG/ML-MCNC: <0.03 NG/ML
WBC # BLD AUTO: 6.83 K/UL (ref 3.9–12.7)

## 2020-03-30 PROCEDURE — 86140 C-REACTIVE PROTEIN: CPT

## 2020-03-30 PROCEDURE — 25000003 PHARM REV CODE 250: Performed by: FAMILY MEDICINE

## 2020-03-30 PROCEDURE — 83735 ASSAY OF MAGNESIUM: CPT

## 2020-03-30 PROCEDURE — 36415 COLL VENOUS BLD VENIPUNCTURE: CPT

## 2020-03-30 PROCEDURE — 85025 COMPLETE CBC W/AUTO DIFF WBC: CPT

## 2020-03-30 PROCEDURE — 94640 AIRWAY INHALATION TREATMENT: CPT

## 2020-03-30 PROCEDURE — 12000002 HC ACUTE/MED SURGE SEMI-PRIVATE ROOM

## 2020-03-30 PROCEDURE — 84484 ASSAY OF TROPONIN QUANT: CPT

## 2020-03-30 PROCEDURE — 27000221 HC OXYGEN, UP TO 24 HOURS

## 2020-03-30 PROCEDURE — 63600175 PHARM REV CODE 636 W HCPCS: Performed by: FAMILY MEDICINE

## 2020-03-30 PROCEDURE — 82728 ASSAY OF FERRITIN: CPT

## 2020-03-30 PROCEDURE — 80053 COMPREHEN METABOLIC PANEL: CPT

## 2020-03-30 PROCEDURE — 25000003 PHARM REV CODE 250: Performed by: INTERNAL MEDICINE

## 2020-03-30 PROCEDURE — 25000003 PHARM REV CODE 250: Performed by: NURSE PRACTITIONER

## 2020-03-30 PROCEDURE — 84145 PROCALCITONIN (PCT): CPT

## 2020-03-30 PROCEDURE — 94761 N-INVAS EAR/PLS OXIMETRY MLT: CPT

## 2020-03-30 PROCEDURE — 63600175 PHARM REV CODE 636 W HCPCS: Performed by: NURSE PRACTITIONER

## 2020-03-30 PROCEDURE — 84100 ASSAY OF PHOSPHORUS: CPT

## 2020-03-30 PROCEDURE — 99900035 HC TECH TIME PER 15 MIN (STAT)

## 2020-03-30 RX ORDER — ALBUTEROL SULFATE 90 UG/1
2 AEROSOL, METERED RESPIRATORY (INHALATION)
Status: DISCONTINUED | OUTPATIENT
Start: 2020-03-30 | End: 2020-03-30

## 2020-03-30 RX ORDER — IPRATROPIUM BROMIDE AND ALBUTEROL SULFATE 2.5; .5 MG/3ML; MG/3ML
3 SOLUTION RESPIRATORY (INHALATION)
Status: DISCONTINUED | OUTPATIENT
Start: 2020-03-31 | End: 2020-04-01

## 2020-03-30 RX ADMIN — LOPERAMIDE HYDROCHLORIDE 2 MG: 2 CAPSULE ORAL at 11:03

## 2020-03-30 RX ADMIN — ALBUTEROL SULFATE 2 PUFF: 90 AEROSOL, METERED RESPIRATORY (INHALATION) at 07:03

## 2020-03-30 RX ADMIN — ALBUTEROL SULFATE 2 PUFF: 90 AEROSOL, METERED RESPIRATORY (INHALATION) at 02:03

## 2020-03-30 RX ADMIN — GUAIFENESIN 600 MG: 600 TABLET, EXTENDED RELEASE ORAL at 08:03

## 2020-03-30 RX ADMIN — PIPERACILLIN SODIUM,TAZOBACTAM SODIUM 3.38 G: 3; .375 INJECTION, POWDER, FOR SOLUTION INTRAVENOUS at 08:03

## 2020-03-30 RX ADMIN — ATORVASTATIN CALCIUM 40 MG: 40 TABLET, FILM COATED ORAL at 08:03

## 2020-03-30 RX ADMIN — ACETAMINOPHEN 650 MG: 325 TABLET ORAL at 05:03

## 2020-03-30 RX ADMIN — PIPERACILLIN SODIUM,TAZOBACTAM SODIUM 3.38 G: 3; .375 INJECTION, POWDER, FOR SOLUTION INTRAVENOUS at 03:03

## 2020-03-30 RX ADMIN — ENOXAPARIN SODIUM 40 MG: 100 INJECTION SUBCUTANEOUS at 04:03

## 2020-03-30 RX ADMIN — POTASSIUM CHLORIDE 40 MEQ: 20 TABLET, EXTENDED RELEASE ORAL at 08:03

## 2020-03-30 RX ADMIN — ACETAMINOPHEN 650 MG: 325 TABLET ORAL at 11:03

## 2020-03-30 RX ADMIN — ALBUTEROL SULFATE 2 PUFF: 90 AEROSOL, METERED RESPIRATORY (INHALATION) at 08:03

## 2020-03-30 RX ADMIN — AZITHROMYCIN 250 MG: 250 TABLET, FILM COATED ORAL at 08:03

## 2020-03-30 NOTE — PLAN OF CARE
Problem: Oral Intake Inadequate  Goal: Improved Oral Intake  Outcome: Ongoing, Progressing       Recommendation/Intervention: 1. Continue diet as tolerated by pt 2. Encourage PO intake of meals/snacks 3.  to assist with meal choices daily     Goals: 1. Pt to meet at least 75% estimated needs via PO diet

## 2020-03-30 NOTE — PROGRESS NOTES
"Consult Note  Infectious Disease  THIS IS A REMOTE CONSULT DUE TO COVID PANDEMIC  INTERPROFRESSIONAL E/M    Reason for Consult:  COVID 19    HPI: Morelia Geiger is a  39 y.o. female : per H and P "39 year old female former smoker with no significant medical history. She presented to the ED with persistent moderate to severe nonproductive cough, associated with fever and mild-moderate shortness of breath for a week. She also reports poor appetite with mild-moderate nausea and some loose stools. She has mid-moderate mid chest pain with cough. She denies sick contacts or recent traveling. 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 She has required increase in oxygen requirement to 3.5 liters of oxygen  With sat 93% and  her CXR has worsened. Still reaching 101 degrees daily and WBC remain low, procal normal, BNP normal.  Chart reviewed and d/w Dr. Velazco.      3/28: INTERPROFRESSIONAL E/M, remote review.  Temps low grade. Sat 90% on 2 liters(was on 3.5 liters with sat 94% yesterday) hgb down 1g, renal and hepatic tests stable. CRP higher. resp notes reviewed. QTc 405 on monitor, K 2.9  3/29: temps still hectic. Overall labs are stable, though CRP is a little higher.  oxygen needs are stable  3/30: still febrile. Oxygen requirement a little higher today. CXR with increased LLL infiltrate. Ferritin less than 300.     flu test : 3/24 neg  Procalcitonin: 0.37, 0.05, 0.05  COVID 19:  3/24 positive  G6PD: normal  QTC baseline:  441  QTC serial: 405, 425  HIV Ab :  Troponin: normal, 0.03  BNP :  Ferritin : 292  Hydroxychloroquine started : 3/24  Darunavir/CbST started  :  azithromycin started: 3/24  Remdesivir  started :       EXAM & DIAGNOSTICS REVIEWED:   Vitals:     Temp:  [98 °F (36.7 °C)-102.9 °F (39.4 °C)]   Temp: 98.3 °F (36.8 °C) (03/30/20 1035)  Pulse: 109 (03/30/20 1035)  Resp: 20 (03/30/20 1035)  BP: 132/66 (03/30/20 1035)  SpO2: (!) 91 " % (03/30/20 1035)    Intake/Output Summary (Last 24 hours) at 3/30/2020 1132  Last data filed at 3/29/2020 1500  Gross per 24 hour   Intake --   Output 2 ml   Net -2 ml        General Labs reviewed:  Recent Labs   Lab 03/28/20  0628 03/29/20  0606 03/30/20  0607   WBC 4.22 4.53 6.83   HGB 10.5* 12.2 10.4*   HCT 33.3* 40.4 33.4*    259 318       Recent Labs   Lab 03/28/20  0628 03/29/20  0606 03/30/20  0607    138 136   K 2.9* 3.3* 3.4*    99 102   CO2 25 25 22*   BUN 7 9 11   CREATININE 0.8 0.9 0.9   CALCIUM 8.2* 8.7 8.2*   PROT 7.3 8.8* 7.5   BILITOT 0.9 1.0 1.0   ALKPHOS 40* 42* 33*   ALT 27 28 21   AST 61* 66* 44*           Micro:  Microbiology Results (last 7 days)     Procedure Component Value Units Date/Time    Blood culture x two cultures. Draw prior to antibiotics. [[id number]] Collected:  03/24/20 2351    Order Status:  Completed Specimen:  Blood from Peripheral, Forearm, Left Updated:  03/30/20 0232     Blood Culture, Routine No growth after 5 days.    Narrative:       Aerobic and anaerobic    Blood culture x two cultures. Draw prior to antibiotics. [[id number]] Collected:  03/24/20 2323    Order Status:  Completed Specimen:  Blood from Peripheral, Upper Arm, Right Updated:  03/30/20 0232     Blood Culture, Routine No growth after 5 days.    Narrative:       Aerobic and anaerobic        Imaging Reviewed:   CXRs      Cardiology:    IMPRESSION & PLAN   1.   COVID pneumonitis  2. Hypoxemia, fever, abnormal CXR    Recommendations:  Continue zithromax daily (250 mg )  Continue HCQ   Consider  scheduled tylenol   telemetry to monitor QTc    Consider change rocephin to zosyn as LLL infiltrate is worse    Will follow remotely

## 2020-03-30 NOTE — PLAN OF CARE
03/29/20 1925   Patient Assessment/Suction   Level of Consciousness (AVPU) alert   Respiratory Effort Unlabored   Expansion/Accessory Muscles/Retractions no use of accessory muscles   All Lung Fields Breath Sounds wheezes, expiratory   Rhythm/Pattern, Respiratory unlabored   Cough Frequency infrequent   Cough Type nonproductive   PRE-TX-O2   O2 Device (Oxygen Therapy) nasal cannula   Flow (L/min) 3   SpO2 97 %   Pulse 97   Resp 18   Inhaler   $ Inhaler Charges MDI (Metered Dose Inahler) Treatment   Daily Review of Necessity (Inhaler) completed   Respiratory Treatment Status (Inhaler) given   Treatment Route (Inhaler) spacer/holding chamber   Patient Position (Inhaler) HOB elevated   Post Treatment Assessment (Inhaler) breath sounds improved   Signs of Intolerance (Inhaler) none   Breath Sounds Post-Respiratory Treatment   Throughout All Fields Post-Treatment All Fields   Throughout All Fields Post-Treatment clear   Post-treatment Heart Rate (beats/min) 98   Post-treatment Resp Rate (breaths/min) 18

## 2020-03-30 NOTE — NURSING
Pt spiked temp of 102.9 oral. Administered scheduled tylenol and notified Dr. Bingham with no further orders. Pt resting in bed. No distress noted. Will continue.

## 2020-03-30 NOTE — PROGRESS NOTES
Progress Note  Patient Name: Morelia Geiger  MRN: [id number]  Admit Date: 3/24/2020   LOS: 5 days      SUBJECTIVE:     Principle problem: Acute respiratory disease due to COVID-19 virus    Interval history:  Continues to have spikes fevers 101.5 on IV antibiotics and hydroxychloroquine. Switched antibiotics. Worsening imaging. Continues to complain of mild shortness of breath and persistent non-productive coughing. Discussed case with . Counseled family to stay quarantined and to monitor for fever or other symptoms. Come to the ED with a surgical mask if  has any concerns.  QTc 364    Scheduled Meds:   acetaminophen  650 mg Oral Q6H    albuterol  2 puff Inhalation Q6H    atorvastatin  40 mg Oral QHS    azithromycin  250 mg Oral Q24H    enoxaparin  40 mg Subcutaneous Daily    guaiFENesin  600 mg Oral BID    piperacillin-tazobactam (ZOSYN) IVPB  3.375 g Intravenous Q8H     Continuous Infusions:  PRN Meds:bisacodyL, calcium chloride IVPB, calcium chloride IVPB, calcium chloride IVPB, hydrocodone-chlorpheniramine, hydrOXYzine pamoate, loperamide, magnesium oxide, magnesium oxide, magnesium oxide, magnesium sulfate IVPB, magnesium sulfate IVPB, magnesium sulfate IVPB, magnesium sulfate IVPB, melatonin, ondansetron, potassium chloride in water, potassium chloride in water, potassium chloride in water, potassium chloride in water, potassium chloride 10%, potassium chloride 10%, potassium chloride 10%, potassium chloride, potassium chloride, potassium chloride, potassium chloride, potassium, sodium phosphates, potassium, sodium phosphates, potassium, sodium phosphates, promethazine (PHENERGAN) IVPB, sodium chloride 0.9%, sodium phosphate IVPB, sodium phosphate IVPB, sodium phosphate IVPB, sodium phosphate IVPB, sodium phosphate IVPB    Review of patient's allergies indicates:   Allergen Reactions    Sulfasalazine Itching       Review of Systems  As per subjective  Constitutional: Negative for chills,  diaphoresis, fatigue, fever and unexpected weight change.   HENT: Negative for congestion, ear pain, postnasal drip, rhinorrhea, sinus pressure, sneezing, sore throat, trouble swallowing and voice change.    Eyes: Negative for pain, discharge, itching and visual disturbance.   Respiratory: Negative for cough, chest tightness, shortness of breath, wheezing and stridor.    Cardiovascular: Negative for chest pain, palpitations and leg swelling.   Gastrointestinal: Negative for abdominal pain, blood in stool, constipation, diarrhea, nausea and vomiting.   Endocrine: Negative for cold intolerance, heat intolerance, polydipsia, polyphagia and polyuria.   Genitourinary: Negative for difficulty urinating, dysuria, flank pain, frequency, hematuria and urgency.   Musculoskeletal: Negative for arthralgias, back pain, joint swelling and myalgias.   Skin: Negative for pallor, rash and wound.   Neurological: Negative for dizziness, seizures, syncope, weakness, light-headedness and headaches.      OBJECTIVE:     Vital Signs (Most Recent)  Temp: 98.3 °F (36.8 °C) (03/30/20 1035)  Pulse: 109 (03/30/20 1035)  Resp: 20 (03/30/20 1035)  BP: 132/66 (03/30/20 1035)  SpO2: (!) 91 % (03/30/20 1035)    Vital Signs Range (Last 24H):  Temp:  [98 °F (36.7 °C)-102.9 °F (39.4 °C)]   Pulse:  []   Resp:  [16-20]   BP: ()/(54-75)   SpO2:  [91 %-98 %]     I & O (Last 24H):    Intake/Output Summary (Last 24 hours) at 3/30/2020 1333  Last data filed at 3/29/2020 1500  Gross per 24 hour   Intake --   Output 1 ml   Net -1 ml     Physical Exam:  General: Patient resting comfortably in no acute distress. Appears as stated age. Calm. Fatigued appearing  Eyes: EOM intact. No conjunctivae injection. No scleral icterus.  ENT: Hearing grossly intact. No discharge from ears. No nasal discharge.   CVS: RRR. No LE edema BL.  Lungs: Crackles in lower lung bases BL. Good breath sounds. No accessory muscle use. No acute respiratory distress.  Neuro:  AOx3. GCS 15. Cranial nerves grossly intact. Moves all extremities equally. Follows commands. Responds appropriately     Laboratory:  BMP:   Recent Labs   Lab 03/30/20  0607   GLU 99      K 3.4*      CO2 22*   BUN 11   CREATININE 0.9   CALCIUM 8.2*   MG 2.4     CMP:   Recent Labs   Lab 03/30/20  0607   GLU 99   CALCIUM 8.2*   ALBUMIN 3.1*   PROT 7.5      K 3.4*   CO2 22*      BUN 11   CREATININE 0.9   ALKPHOS 33*   ALT 21   AST 44*   BILITOT 1.0         ASSESSMENT/PLAN:     39-year-old female no significant past pain history admitted for acute hypoxic respiratory failure due to COVID-19.    Active Hospital Problems    Diagnosis    *Acute respiratory disease due to COVID-19 virus    Pneumonia      Plan:  Procal WNL, influenza neg  COVID-19 positive  Empiric hydroxychloroquine, azithromycin  D/c rocephin. Started zosyn per ID recommendations  Airborne and contact precautions  G6PD WNL  Atorvastatin  Daily procalcitonin, CRP  BCx NGTD  Continues to spike high fevers      VTE Risk Mitigation (From admission, onward)         Ordered     enoxaparin injection 40 mg  Daily      03/25/20 0128     IP VTE HIGH RISK PATIENT  Once      03/25/20 0128              Department Hospital Medicine  [facility]  Goyo Velazco MD  Date of service: 03/30/2020

## 2020-03-30 NOTE — PLAN OF CARE
03/30/20 0700   Patient Assessment/Suction   Level of Consciousness (AVPU) alert   Respiratory Effort Normal;Unlabored   Expansion/Accessory Muscles/Retractions expansion symmetric;no retractions;no use of accessory muscles   All Lung Fields Breath Sounds wheezes, expiratory   Cough Frequency frequent   Cough Type good;nonproductive   PRE-TX-O2   O2 Device (Oxygen Therapy) nasal cannula   $ Is the patient on Low Flow Oxygen? Yes   Flow (L/min) 3   SpO2 96 %   Pulse Oximetry Type Intermittent   $ Pulse Oximetry - Multiple Charge Pulse Oximetry - Multiple   Pulse 94   Resp 18   Inhaler   $ Inhaler Charges MDI (Metered Dose Inahler) Treatment;Given With Spacer   Daily Review of Necessity (Inhaler) completed   Respiratory Treatment Status (Inhaler) given   Treatment Route (Inhaler) spacer/holding chamber   Patient Position (Inhaler) semi-Abernathy's   Post Treatment Assessment (Inhaler) breath sounds unchanged   Signs of Intolerance (Inhaler) excessive cough   Respiratory Evaluation   $ Care Plan Tech Time 15 min

## 2020-03-30 NOTE — PROGRESS NOTES
"[facility]  Adult Nutrition   Progress Note (Initial Assessment)     SUMMARY     Recommendations  Recommendation/Intervention: 1. Continue diet as tolerated by pt 2. Encourage PO intake of meals/snacks 3.  to assist with meal choices daily   Goals: 1. Pt to meet at least 75% estimated needs via PO diet  Nutrition Goal Status: new    Dietitian Rounds Brief    Pt assessed 2' LOS. Discussed nutrition care with pt via phone. Intake poor; pt reporting no appetite. Denies any N/V. LBM 3/29. Pt declined all offers for oral supplements or shakes. Food prefs obtained--will honor. RD encouraged intake of meals and snacks. Provided pt with telephone # to kitchen and encouraged to call with any needs.       Reason for Assessment  Reason For Assessment: length of stay  Diagnosis: pulmonary disease  Relevant Medical History: Andrea prieto    Nutrition Risk Screen  Nutrition Risk Screen: no indicators present     MST Score: 1  Have you recently lost weight without trying?: No  Weight loss score: 0  Have you been eating poorly because of a decreased appetite?: Yes  Appetite score: 1       Nutrition/Diet History  Spiritual, Cultural Beliefs, Jainism Practices, Values that Affect Care: no  Food Allergies: NKFA  Factors Affecting Nutritional Intake: decreased appetite    Anthropometrics  Temp: 98.3 °F (36.8 °C)  Height Method: Stated  Height: 5' 8" (172.7 cm)  Height (inches): 68 in  Weight Method: Bed Scale  Weight: 117.9 kg (260 lb)  Weight (lb): 260 lb  Ideal Body Weight (IBW), Female: 140 lb  % Ideal Body Weight, Female (lb): 185.71 %  BMI (Calculated): 39.5  BMI Grade: 35 - 39.9 - obesity - grade II       Weight History:  Wt Readings from Last 10 Encounters:   03/30/20 117.9 kg (260 lb)   06/30/17 114.6 kg (252 lb 10.4 oz)       Lab/Procedures/Meds: Pertinent Labs Reviewed  Clinical Chemistry:  Recent Labs   Lab 03/28/20  0628 03/29/20  0606 03/30/20  0607    138 136   K 2.9* 3.3* 3.4*    99 102 "   CO2 25 25 22*    90 99   BUN 7 9 11   CREATININE 0.8 0.9 0.9   CALCIUM 8.2* 8.7 8.2*   PROT 7.3 8.8* 7.5   ALBUMIN 3.0* 3.5 3.1*   BILITOT 0.9 1.0 1.0   ALKPHOS 40* 42* 33*   AST 61* 66* 44*   ALT 27 28 21   ANIONGAP 12 14 12   ESTGFRAFRICA >60.0 >60.0 >60.0   EGFRNONAA >60.0 >60.0 >60.0   MG 1.9 2.2 2.4   PHOS 2.8 3.5 3.1     CBC:   Recent Labs   Lab 03/30/20  0607   WBC 6.83   RBC 4.08   HGB 10.4*   HCT 33.4*      MCV 82   MCH 25.5*   MCHC 31.1*     Lipid Panel:  No results for input(s): CHOL, HDL, LDLCALC, TRIG, CHOLHDL in the last 168 hours.  Cardiac Profile:  Recent Labs   Lab 03/24/20  2230 03/28/20  0628 03/29/20  0606 03/30/20  0607   BNP 15 6  --   --    *  --   --   --    CPKMB 0.7  --   --   --    TROPONINI <0.030 <0.030 <0.030 <0.030     Inflammatory Labs:  Recent Labs   Lab 03/28/20  0628 03/29/20  0606 03/30/20  0607   CRP 5.29* 7.13* 6.95*     Diabetes:  No results for input(s): HGBA1C, POCTGLUCOSE in the last 168 hours.  Thyroid & Parathyroid:  No results for input(s): TSH, FREET4, O3HBSDK, Q8HCJRZ, THYROIDAB in the last 168 hours.  Vitamins:  No results for input(s): NFAXJGDI48, YFNAGZDW32PL in the last 168 hours.    Medications: Pertinent Medications reviewed  Scheduled Meds:   acetaminophen  650 mg Oral Q6H    albuterol  2 puff Inhalation Q6H    atorvastatin  40 mg Oral QHS    azithromycin  250 mg Oral Q24H    cefTRIAXone (ROCEPHIN) IVPB  1 g Intravenous Q24H    enoxaparin  40 mg Subcutaneous Daily    guaiFENesin  600 mg Oral BID     Continuous Infusions:  PRN Meds:.bisacodyL, calcium chloride IVPB, calcium chloride IVPB, calcium chloride IVPB, hydrocodone-chlorpheniramine, hydrOXYzine pamoate, loperamide, magnesium oxide, magnesium oxide, magnesium oxide, magnesium sulfate IVPB, magnesium sulfate IVPB, magnesium sulfate IVPB, magnesium sulfate IVPB, melatonin, ondansetron, potassium chloride in water, potassium chloride in water, potassium chloride in water,  potassium chloride in water, potassium chloride 10%, potassium chloride 10%, potassium chloride 10%, potassium chloride, potassium chloride, potassium chloride, potassium chloride, potassium, sodium phosphates, potassium, sodium phosphates, potassium, sodium phosphates, promethazine (PHENERGAN) IVPB, sodium chloride 0.9%, sodium phosphate IVPB, sodium phosphate IVPB, sodium phosphate IVPB, sodium phosphate IVPB, sodium phosphate IVPB    Estimated/Assessed Needs  Weight Used For Calorie Calculations: 117.9 kg (259 lb 14.8 oz)  Energy Calorie Requirements (kcal): 6567-5063 (15-20 kcal/kg)  Energy Need Method: Kcal/kg  Protein Requirements: 96 g (1.5 g/kg) per IBW  Weight Used For Protein Calculations: 64 kg (141 lb 1.5 oz)(IBW)     Estimated Fluid Requirement Method: RDA Method  RDA Method (mL): 1170       Nutrition Prescription Ordered  Current Diet Order: regular     Evaluation of Received Nutrient/Fluid Intake  Energy Calories Required: not meeting needs  Protein Required: not meeting needs  Fluid Required: meeting needs  Tolerance: tolerating     Intake/Output Summary (Last 24 hours) at 3/30/2020 1132  Last data filed at 3/29/2020 1500  Gross per 24 hour   Intake --   Output 2 ml   Net -2 ml        % Meal Intake: 0 - 25 %    Nutrition Risk  Level of Risk/Frequency of Follow-up: high     Monitor and Evaluation  Food and Nutrient Intake: energy intake, food and beverage intake  Food and Nutrient Adminstration: diet order  Physical Activity and Function: nutrition-related ADLs and IADLs  Anthropometric Measurements: weight change, body mass index, weight  Biochemical Data, Medical Tests and Procedures: gastrointestinal profile, electrolyte and renal panel, glucose/endocrine profile  Nutrition-Focused Physical Findings: overall appearance     Nutrition Follow-Up  RD Follow-up?: Yes    Margot Chavez RD 03/30/2020 11:32 AM

## 2020-03-31 LAB
ALBUMIN SERPL BCP-MCNC: 3 G/DL (ref 3.5–5.2)
ALP SERPL-CCNC: 32 U/L (ref 55–135)
ALT SERPL W/O P-5'-P-CCNC: 18 U/L (ref 10–44)
ANION GAP SERPL CALC-SCNC: 12 MMOL/L (ref 8–16)
AST SERPL-CCNC: 35 U/L (ref 10–40)
BASOPHILS # BLD AUTO: 0.01 K/UL (ref 0–0.2)
BASOPHILS NFR BLD: 0.2 % (ref 0–1.9)
BILIRUB SERPL-MCNC: 1.1 MG/DL (ref 0.1–1)
BUN SERPL-MCNC: 11 MG/DL (ref 6–20)
CALCIUM SERPL-MCNC: 8.4 MG/DL (ref 8.7–10.5)
CHLORIDE SERPL-SCNC: 101 MMOL/L (ref 95–110)
CO2 SERPL-SCNC: 25 MMOL/L (ref 23–29)
CREAT SERPL-MCNC: 0.9 MG/DL (ref 0.5–1.4)
CRP SERPL-MCNC: 6.99 MG/DL (ref 0–0.75)
DIFFERENTIAL METHOD: ABNORMAL
EOSINOPHIL # BLD AUTO: 0 K/UL (ref 0–0.5)
EOSINOPHIL NFR BLD: 0.7 % (ref 0–8)
ERYTHROCYTE [DISTWIDTH] IN BLOOD BY AUTOMATED COUNT: 15.2 % (ref 11.5–14.5)
EST. GFR  (AFRICAN AMERICAN): >60 ML/MIN/1.73 M^2
EST. GFR  (NON AFRICAN AMERICAN): >60 ML/MIN/1.73 M^2
GLUCOSE SERPL-MCNC: 93 MG/DL (ref 70–110)
HCT VFR BLD AUTO: 32.4 % (ref 37–48.5)
HGB BLD-MCNC: 10 G/DL (ref 12–16)
IMM GRANULOCYTES # BLD AUTO: 0.05 K/UL (ref 0–0.04)
IMM GRANULOCYTES NFR BLD AUTO: 0.8 % (ref 0–0.5)
LYMPHOCYTES # BLD AUTO: 1.3 K/UL (ref 1–4.8)
LYMPHOCYTES NFR BLD: 21.4 % (ref 18–48)
MAGNESIUM SERPL-MCNC: 2.5 MG/DL (ref 1.6–2.6)
MCH RBC QN AUTO: 25.4 PG (ref 27–31)
MCHC RBC AUTO-ENTMCNC: 30.9 G/DL (ref 32–36)
MCV RBC AUTO: 82 FL (ref 82–98)
MONOCYTES # BLD AUTO: 0.3 K/UL (ref 0.3–1)
MONOCYTES NFR BLD: 5 % (ref 4–15)
NEUTROPHILS # BLD AUTO: 4.3 K/UL (ref 1.8–7.7)
NEUTROPHILS NFR BLD: 71.9 % (ref 38–73)
NRBC BLD-RTO: 0 /100 WBC
PHOSPHATE SERPL-MCNC: 3.6 MG/DL (ref 2.7–4.5)
PLATELET # BLD AUTO: 326 K/UL (ref 150–350)
PMV BLD AUTO: 10.6 FL (ref 9.2–12.9)
POTASSIUM SERPL-SCNC: 3.1 MMOL/L (ref 3.5–5.1)
PROCALCITONIN SERPL IA-MCNC: <0.05 NG/ML (ref 0–0.5)
PROT SERPL-MCNC: 7.3 G/DL (ref 6–8.4)
RBC # BLD AUTO: 3.93 M/UL (ref 4–5.4)
SODIUM SERPL-SCNC: 138 MMOL/L (ref 136–145)
TROPONIN I SERPL DL<=0.01 NG/ML-MCNC: <0.03 NG/ML
WBC # BLD AUTO: 5.97 K/UL (ref 3.9–12.7)

## 2020-03-31 PROCEDURE — 12000002 HC ACUTE/MED SURGE SEMI-PRIVATE ROOM

## 2020-03-31 PROCEDURE — 85025 COMPLETE CBC W/AUTO DIFF WBC: CPT

## 2020-03-31 PROCEDURE — 80053 COMPREHEN METABOLIC PANEL: CPT

## 2020-03-31 PROCEDURE — 25000003 PHARM REV CODE 250: Performed by: INTERNAL MEDICINE

## 2020-03-31 PROCEDURE — 63600175 PHARM REV CODE 636 W HCPCS: Performed by: FAMILY MEDICINE

## 2020-03-31 PROCEDURE — 27000221 HC OXYGEN, UP TO 24 HOURS

## 2020-03-31 PROCEDURE — 84145 PROCALCITONIN (PCT): CPT

## 2020-03-31 PROCEDURE — 25000003 PHARM REV CODE 250: Performed by: FAMILY MEDICINE

## 2020-03-31 PROCEDURE — 94640 AIRWAY INHALATION TREATMENT: CPT

## 2020-03-31 PROCEDURE — 84484 ASSAY OF TROPONIN QUANT: CPT

## 2020-03-31 PROCEDURE — 25000003 PHARM REV CODE 250: Performed by: NURSE PRACTITIONER

## 2020-03-31 PROCEDURE — 83735 ASSAY OF MAGNESIUM: CPT

## 2020-03-31 PROCEDURE — 63600175 PHARM REV CODE 636 W HCPCS: Performed by: NURSE PRACTITIONER

## 2020-03-31 PROCEDURE — 25000242 PHARM REV CODE 250 ALT 637 W/ HCPCS: Performed by: INTERNAL MEDICINE

## 2020-03-31 PROCEDURE — 94761 N-INVAS EAR/PLS OXIMETRY MLT: CPT

## 2020-03-31 PROCEDURE — 63700000 PHARM REV CODE 250 ALT 637 W/O HCPCS: Performed by: FAMILY MEDICINE

## 2020-03-31 PROCEDURE — 84100 ASSAY OF PHOSPHORUS: CPT

## 2020-03-31 PROCEDURE — 86140 C-REACTIVE PROTEIN: CPT

## 2020-03-31 PROCEDURE — 36415 COLL VENOUS BLD VENIPUNCTURE: CPT

## 2020-03-31 PROCEDURE — 99900035 HC TECH TIME PER 15 MIN (STAT)

## 2020-03-31 RX ORDER — LEVOFLOXACIN 750 MG/1
750 TABLET ORAL DAILY
Status: DISCONTINUED | OUTPATIENT
Start: 2020-03-31 | End: 2020-03-31

## 2020-03-31 RX ORDER — HYDROXYCHLOROQUINE SULFATE 200 MG/1
400 TABLET, FILM COATED ORAL DAILY
Status: DISCONTINUED | OUTPATIENT
Start: 2020-03-31 | End: 2020-04-03 | Stop reason: HOSPADM

## 2020-03-31 RX ORDER — POTASSIUM CHLORIDE 20 MEQ/1
40 TABLET, EXTENDED RELEASE ORAL 2 TIMES DAILY
Status: COMPLETED | OUTPATIENT
Start: 2020-03-31 | End: 2020-04-02

## 2020-03-31 RX ADMIN — GUAIFENESIN 600 MG: 600 TABLET, EXTENDED RELEASE ORAL at 08:03

## 2020-03-31 RX ADMIN — HYDROXYCHLOROQUINE SULFATE 400 MG: 200 TABLET, FILM COATED ORAL at 02:03

## 2020-03-31 RX ADMIN — POTASSIUM CHLORIDE 40 MEQ: 20 TABLET, EXTENDED RELEASE ORAL at 09:03

## 2020-03-31 RX ADMIN — IPRATROPIUM BROMIDE AND ALBUTEROL SULFATE 3 ML: .5; 3 SOLUTION RESPIRATORY (INHALATION) at 08:03

## 2020-03-31 RX ADMIN — GUAIFENESIN 600 MG: 600 TABLET, EXTENDED RELEASE ORAL at 09:03

## 2020-03-31 RX ADMIN — POTASSIUM CHLORIDE 40 MEQ: 20 TABLET, EXTENDED RELEASE ORAL at 08:03

## 2020-03-31 RX ADMIN — ACETAMINOPHEN 650 MG: 325 TABLET ORAL at 12:03

## 2020-03-31 RX ADMIN — AZITHROMYCIN 250 MG: 250 TABLET, FILM COATED ORAL at 09:03

## 2020-03-31 RX ADMIN — ACETAMINOPHEN 650 MG: 325 TABLET ORAL at 11:03

## 2020-03-31 RX ADMIN — IPRATROPIUM BROMIDE AND ALBUTEROL SULFATE 3 ML: .5; 3 SOLUTION RESPIRATORY (INHALATION) at 02:03

## 2020-03-31 RX ADMIN — LOPERAMIDE HYDROCHLORIDE 2 MG: 2 CAPSULE ORAL at 12:03

## 2020-03-31 RX ADMIN — PIPERACILLIN AND TAZOBACTAM 3.38 G: 3; .375 INJECTION, POWDER, FOR SOLUTION INTRAVENOUS at 12:03

## 2020-03-31 RX ADMIN — ATORVASTATIN CALCIUM 40 MG: 40 TABLET, FILM COATED ORAL at 08:03

## 2020-03-31 RX ADMIN — ACETAMINOPHEN 650 MG: 325 TABLET ORAL at 05:03

## 2020-03-31 RX ADMIN — ACETAMINOPHEN 650 MG: 325 TABLET ORAL at 06:03

## 2020-03-31 RX ADMIN — PIPERACILLIN SODIUM,TAZOBACTAM SODIUM 3.38 G: 3; .375 INJECTION, POWDER, FOR SOLUTION INTRAVENOUS at 05:03

## 2020-03-31 RX ADMIN — ENOXAPARIN SODIUM 40 MG: 100 INJECTION SUBCUTANEOUS at 05:03

## 2020-03-31 RX ADMIN — PIPERACILLIN AND TAZOBACTAM 3.38 G: 3; .375 INJECTION, POWDER, FOR SOLUTION INTRAVENOUS at 08:03

## 2020-03-31 NOTE — PROGRESS NOTES
Progress Note  Patient Name: Morelia Geiger  MRN: [id number]  Admit Date: 3/24/2020   LOS: 6 days      SUBJECTIVE:     Principle problem: Acute respiratory disease due to COVID-19 virus    Interval history:  Afebrile the past 24 hr.  Completed treatment for COVID-19. On zosyn for suspected PNA with worsening CXR. Procalcitonin pending.  Now hypoxic, 84% on room air with ambulation.  Encouraged patient to sit up in chair.  Patient laying in bed and has not gotten out.  Incentive spirometer ordered.    Scheduled Meds:   acetaminophen  650 mg Oral Q6H    albuterol-ipratropium  3 mL Nebulization TID WAKE    atorvastatin  40 mg Oral QHS    enoxaparin  40 mg Subcutaneous Daily    guaiFENesin  600 mg Oral BID    potassium chloride  40 mEq Oral BID     Continuous Infusions:  PRN Meds:bisacodyL, calcium chloride IVPB, calcium chloride IVPB, calcium chloride IVPB, hydrocodone-chlorpheniramine, hydrOXYzine pamoate, loperamide, magnesium oxide, magnesium oxide, magnesium oxide, magnesium sulfate IVPB, magnesium sulfate IVPB, magnesium sulfate IVPB, magnesium sulfate IVPB, melatonin, ondansetron, potassium chloride in water, potassium chloride in water, potassium chloride in water, potassium chloride in water, potassium chloride 10%, potassium chloride 10%, potassium chloride 10%, potassium chloride, potassium chloride, potassium chloride, potassium chloride, potassium, sodium phosphates, potassium, sodium phosphates, potassium, sodium phosphates, promethazine (PHENERGAN) IVPB, sodium chloride 0.9%, sodium phosphate IVPB, sodium phosphate IVPB, sodium phosphate IVPB, sodium phosphate IVPB, sodium phosphate IVPB    Review of patient's allergies indicates:   Allergen Reactions    Sulfasalazine Itching       Review of Systems  As per subjective  Constitutional: Negative for chills, diaphoresis, fatigue, fever and unexpected weight change.   HENT: Negative for congestion, ear pain, postnasal drip, rhinorrhea, sinus pressure,  sneezing, sore throat, trouble swallowing and voice change.    Eyes: Negative for pain, discharge, itching and visual disturbance.   Respiratory: Negative for cough, chest tightness, shortness of breath, wheezing and stridor.    Cardiovascular: Negative for chest pain, palpitations and leg swelling.   Gastrointestinal: Negative for abdominal pain, blood in stool, constipation, diarrhea, nausea and vomiting.   Endocrine: Negative for cold intolerance, heat intolerance, polydipsia, polyphagia and polyuria.   Genitourinary: Negative for difficulty urinating, dysuria, flank pain, frequency, hematuria and urgency.   Musculoskeletal: Negative for arthralgias, back pain, joint swelling and myalgias.   Skin: Negative for pallor, rash and wound.   Neurological: Negative for dizziness, seizures, syncope, weakness, light-headedness and headaches.      OBJECTIVE:     Vital Signs (Most Recent)  Temp: 98.9 °F (37.2 °C) (03/31/20 0900)  Pulse: (!) 117 (03/31/20 0900)  Resp: 20 (03/31/20 0900)  BP: 126/70 (03/31/20 0900)  SpO2: 96 % (03/31/20 0900)    Vital Signs Range (Last 24H):  Temp:  [98.3 °F (36.8 °C)-98.9 °F (37.2 °C)]   Pulse:  []   Resp:  [17-21]   BP: (100-126)/(61-72)   SpO2:  [94 %-100 %]     I & O (Last 24H):  No intake or output data in the 24 hours ending 03/31/20 1137    Physical Exam:  General: Patient resting comfortably in no acute distress. Appears as stated age. Calm. Well appearing  Eyes: EOM intact. No conjunctivae injection. No scleral icterus.  ENT: Hearing grossly intact. No discharge from ears. No nasal discharge.   CVS: RRR. No LE edema BL.  Lungs: Crackles in lower lung bases BL. Good breath sounds. No accessory muscle use. No acute respiratory distress.  Neuro: AOx3. GCS 15. Cranial nerves grossly intact. Moves all extremities equally. Follows commands. Responds appropriately     Laboratory:  BMP:   Recent Labs   Lab 03/31/20  0533   GLU 93      K 3.1*      CO2 25   BUN 11    CREATININE 0.9   CALCIUM 8.4*   MG 2.5     CMP:   Recent Labs   Lab 03/31/20  0533   GLU 93   CALCIUM 8.4*   ALBUMIN 3.0*   PROT 7.3      K 3.1*   CO2 25      BUN 11   CREATININE 0.9   ALKPHOS 32*   ALT 18   AST 35   BILITOT 1.1*         ASSESSMENT/PLAN:     39-year-old female no significant past pain history admitted for acute hypoxic respiratory failure due to COVID-19.    Active Hospital Problems    Diagnosis    *Acute respiratory disease due to COVID-19 virus    Pneumonia      Plan:  Procal WNL, influenza neg  COVID-19 positive  Completed hydroxychloroquine, azithromycin  D/c rocephin. Started zosyn per ID recommendations  Airborne and contact precautions  G6PD WNL  Atorvastatin  Daily procalcitonin, CRP  BCx NGTD  Procal pending      VTE Risk Mitigation (From admission, onward)         Ordered     enoxaparin injection 40 mg  Daily      03/25/20 0128     IP VTE HIGH RISK PATIENT  Once      03/25/20 0128              Department Hospital Medicine  On license of [facility]  Goyo Velazco MD  Date of service: 03/31/2020

## 2020-03-31 NOTE — PROGRESS NOTES
Chart reviewed.  Afebrile.  On 5 L  WBC of 5.  On Zithromax and Zosyn.  Has completed 6 days of hydroxychloroquine and 7 days of Zithromax.  Nothing new in cultures.  Chest x-ray of 0330 had bilateral lung opacities.  CRP is ranging about the same 5-7--6.9 -6.9    Will add ferritin for a.m.

## 2020-03-31 NOTE — PLAN OF CARE
03/31/20 0815   Patient Assessment/Suction   Level of Consciousness (AVPU) alert   Respiratory Effort Normal;Unlabored   All Lung Fields Breath Sounds diminished   PRE-TX-O2   O2 Device (Oxygen Therapy) nasal cannula w/ humidification   $ Is the patient on Low Flow Oxygen? Yes   Flow (L/min) 5   SpO2 96 %   Pulse Oximetry Type Continuous   $ Pulse Oximetry - Multiple Charge Pulse Oximetry - Multiple   Pulse 110   Resp 18   Aerosol Therapy   $ Aerosol Therapy Charges Aerosol Treatment   Daily Review of Necessity (SVN) completed   Respiratory Treatment Status (SVN) given   Treatment Route (SVN) mask;oxygen   Patient Position (SVN) HOB elevated   Post Treatment Assessment (SVN) breath sounds improved   Signs of Intolerance (SVN) none   Inhaler   $ Inhaler Charges PRN treatment not required   Respiratory Evaluation   $ Care Plan Tech Time 15 min

## 2020-03-31 NOTE — PLAN OF CARE
Problem: Adult Inpatient Plan of Care  Goal: Plan of Care Review  Outcome: Ongoing, Progressing  Goal: Patient-Specific Goal (Individualization)  Outcome: Ongoing, Progressing  Goal: Absence of Hospital-Acquired Illness or Injury  Outcome: Ongoing, Progressing  Goal: Optimal Comfort and Wellbeing  Outcome: Ongoing, Progressing  Goal: Readiness for Transition of Care  Outcome: Ongoing, Progressing  Goal: Rounds/Family Conference  Outcome: Ongoing, Progressing     Problem: Fall Injury Risk  Goal: Absence of Fall and Fall-Related Injury  Outcome: Ongoing, Progressing     Problem: Fluid Imbalance (Pneumonia)  Goal: Fluid Balance  Outcome: Ongoing, Progressing     Problem: Infection (Pneumonia)  Goal: Resolution of Infection Signs/Symptoms  Outcome: Ongoing, Progressing     Problem: Respiratory Compromise (Pneumonia)  Goal: Effective Oxygenation and Ventilation  Outcome: Ongoing, Progressing     Problem: Oral Intake Inadequate  Goal: Improved Oral Intake  Outcome: Ongoing, Progressing

## 2020-03-31 NOTE — PLAN OF CARE
Problem: Adult Inpatient Plan of Care  Goal: Plan of Care Review  3/31/2020 0531 by Horacio Calderón RN  Outcome: Ongoing, Progressing  3/31/2020 0511 by Horacio Calderón RN  Outcome: Ongoing, Progressing  Goal: Patient-Specific Goal (Individualization)  3/31/2020 0531 by Horacio Calderón RN  Outcome: Ongoing, Progressing  3/31/2020 0511 by Horacio Calderón RN  Outcome: Ongoing, Progressing  Goal: Absence of Hospital-Acquired Illness or Injury  3/31/2020 0531 by Horacio Calderón RN  Outcome: Ongoing, Progressing  3/31/2020 0511 by Horacio Calderón RN  Outcome: Ongoing, Progressing  Goal: Optimal Comfort and Wellbeing  3/31/2020 0531 by Horacio Calderón RN  Outcome: Ongoing, Progressing  3/31/2020 0511 by Horacio Calderón RN  Outcome: Ongoing, Progressing  Goal: Readiness for Transition of Care  3/31/2020 0531 by Horacio Calderón RN  Outcome: Ongoing, Progressing  3/31/2020 0511 by Horacio Calderón RN  Outcome: Ongoing, Progressing  Goal: Rounds/Family Conference  3/31/2020 0531 by Horacio Calderón RN  Outcome: Ongoing, Progressing  3/31/2020 0511 by Horacio Calderón RN  Outcome: Ongoing, Progressing     Problem: Fall Injury Risk  Goal: Absence of Fall and Fall-Related Injury  3/31/2020 0531 by Horacio Calderón RN  Outcome: Ongoing, Progressing  3/31/2020 0511 by Horacio Calderón RN  Outcome: Ongoing, Progressing     Problem: Fluid Imbalance (Pneumonia)  Goal: Fluid Balance  3/31/2020 0531 by Horacio Calderón RN  Outcome: Ongoing, Progressing  3/31/2020 0511 by Horacio Calderón RN  Outcome: Ongoing, Progressing     Problem: Infection (Pneumonia)  Goal: Resolution of Infection Signs/Symptoms  3/31/2020 0531 by Horacio Calderón RN  Outcome: Ongoing, Progressing  3/31/2020 0511 by Horacio Calderón RN  Outcome: Ongoing, Progressing     Problem: Respiratory Compromise (Pneumonia)  Goal: Effective Oxygenation and Ventilation  3/31/2020 0531 by Horacio Calderón RN  Outcome: Ongoing, Progressing  3/31/2020 0511 by Horacio  Calderón, RN  Outcome: Ongoing, Progressing     Problem: Oral Intake Inadequate  Goal: Improved Oral Intake  3/31/2020 0531 by Horacio Calderón RN  Outcome: Ongoing, Progressing  3/31/2020 0511 by Horacio Calderón RN  Outcome: Ongoing, Progressing

## 2020-04-01 LAB
ALBUMIN SERPL BCP-MCNC: 3 G/DL (ref 3.5–5.2)
ALP SERPL-CCNC: 35 U/L (ref 55–135)
ALT SERPL W/O P-5'-P-CCNC: 18 U/L (ref 10–44)
ANION GAP SERPL CALC-SCNC: 9 MMOL/L (ref 8–16)
AST SERPL-CCNC: 30 U/L (ref 10–40)
BASOPHILS # BLD AUTO: 0.01 K/UL (ref 0–0.2)
BASOPHILS NFR BLD: 0.2 % (ref 0–1.9)
BILIRUB SERPL-MCNC: 0.8 MG/DL (ref 0.1–1)
BUN SERPL-MCNC: 12 MG/DL (ref 6–20)
CALCIUM SERPL-MCNC: 8.6 MG/DL (ref 8.7–10.5)
CHLORIDE SERPL-SCNC: 104 MMOL/L (ref 95–110)
CO2 SERPL-SCNC: 27 MMOL/L (ref 23–29)
CREAT SERPL-MCNC: 0.7 MG/DL (ref 0.5–1.4)
CRP SERPL-MCNC: 6.09 MG/DL (ref 0–0.75)
DIFFERENTIAL METHOD: ABNORMAL
EOSINOPHIL # BLD AUTO: 0.2 K/UL (ref 0–0.5)
EOSINOPHIL NFR BLD: 3 % (ref 0–8)
ERYTHROCYTE [DISTWIDTH] IN BLOOD BY AUTOMATED COUNT: 15.1 % (ref 11.5–14.5)
EST. GFR  (AFRICAN AMERICAN): >60 ML/MIN/1.73 M^2
EST. GFR  (NON AFRICAN AMERICAN): >60 ML/MIN/1.73 M^2
FERRITIN SERPL-MCNC: 303 NG/ML (ref 20–300)
GLUCOSE SERPL-MCNC: 92 MG/DL (ref 70–110)
HCT VFR BLD AUTO: 33 % (ref 37–48.5)
HGB BLD-MCNC: 10.2 G/DL (ref 12–16)
IMM GRANULOCYTES # BLD AUTO: 0.04 K/UL (ref 0–0.04)
IMM GRANULOCYTES NFR BLD AUTO: 0.8 % (ref 0–0.5)
LYMPHOCYTES # BLD AUTO: 1.4 K/UL (ref 1–4.8)
LYMPHOCYTES NFR BLD: 27.1 % (ref 18–48)
MAGNESIUM SERPL-MCNC: 2.3 MG/DL (ref 1.6–2.6)
MCH RBC QN AUTO: 25.6 PG (ref 27–31)
MCHC RBC AUTO-ENTMCNC: 30.9 G/DL (ref 32–36)
MCV RBC AUTO: 83 FL (ref 82–98)
MONOCYTES # BLD AUTO: 0.5 K/UL (ref 0.3–1)
MONOCYTES NFR BLD: 8.5 % (ref 4–15)
NEUTROPHILS # BLD AUTO: 3.2 K/UL (ref 1.8–7.7)
NEUTROPHILS NFR BLD: 60.4 % (ref 38–73)
NRBC BLD-RTO: 0 /100 WBC
PHOSPHATE SERPL-MCNC: 2.8 MG/DL (ref 2.7–4.5)
PLATELET # BLD AUTO: 350 K/UL (ref 150–350)
PMV BLD AUTO: 10.1 FL (ref 9.2–12.9)
POTASSIUM SERPL-SCNC: 3.7 MMOL/L (ref 3.5–5.1)
PROCALCITONIN SERPL IA-MCNC: <0.05 NG/ML (ref 0–0.5)
PROT SERPL-MCNC: 7.7 G/DL (ref 6–8.4)
RBC # BLD AUTO: 3.98 M/UL (ref 4–5.4)
SODIUM SERPL-SCNC: 140 MMOL/L (ref 136–145)
TROPONIN I SERPL DL<=0.01 NG/ML-MCNC: <0.03 NG/ML
WBC # BLD AUTO: 5.31 K/UL (ref 3.9–12.7)

## 2020-04-01 PROCEDURE — 63600175 PHARM REV CODE 636 W HCPCS: Performed by: NURSE PRACTITIONER

## 2020-04-01 PROCEDURE — 84100 ASSAY OF PHOSPHORUS: CPT

## 2020-04-01 PROCEDURE — 12000002 HC ACUTE/MED SURGE SEMI-PRIVATE ROOM

## 2020-04-01 PROCEDURE — 25000003 PHARM REV CODE 250: Performed by: INTERNAL MEDICINE

## 2020-04-01 PROCEDURE — 25000003 PHARM REV CODE 250: Performed by: NURSE PRACTITIONER

## 2020-04-01 PROCEDURE — 94640 AIRWAY INHALATION TREATMENT: CPT

## 2020-04-01 PROCEDURE — 25000242 PHARM REV CODE 250 ALT 637 W/ HCPCS: Performed by: INTERNAL MEDICINE

## 2020-04-01 PROCEDURE — 36415 COLL VENOUS BLD VENIPUNCTURE: CPT

## 2020-04-01 PROCEDURE — 63600175 PHARM REV CODE 636 W HCPCS: Performed by: FAMILY MEDICINE

## 2020-04-01 PROCEDURE — 82728 ASSAY OF FERRITIN: CPT

## 2020-04-01 PROCEDURE — 85025 COMPLETE CBC W/AUTO DIFF WBC: CPT

## 2020-04-01 PROCEDURE — 25000003 PHARM REV CODE 250: Performed by: FAMILY MEDICINE

## 2020-04-01 PROCEDURE — 84484 ASSAY OF TROPONIN QUANT: CPT

## 2020-04-01 PROCEDURE — 86140 C-REACTIVE PROTEIN: CPT

## 2020-04-01 PROCEDURE — 83735 ASSAY OF MAGNESIUM: CPT

## 2020-04-01 PROCEDURE — 27000221 HC OXYGEN, UP TO 24 HOURS

## 2020-04-01 PROCEDURE — 94761 N-INVAS EAR/PLS OXIMETRY MLT: CPT

## 2020-04-01 PROCEDURE — 84145 PROCALCITONIN (PCT): CPT

## 2020-04-01 PROCEDURE — 80053 COMPREHEN METABOLIC PANEL: CPT

## 2020-04-01 RX ORDER — ALBUTEROL SULFATE 90 UG/1
2 AEROSOL, METERED RESPIRATORY (INHALATION) EVERY 8 HOURS
Status: DISCONTINUED | OUTPATIENT
Start: 2020-04-01 | End: 2020-04-03

## 2020-04-01 RX ORDER — BENZONATATE 100 MG/1
100 CAPSULE ORAL 3 TIMES DAILY PRN
Status: DISCONTINUED | OUTPATIENT
Start: 2020-04-01 | End: 2020-04-03 | Stop reason: HOSPADM

## 2020-04-01 RX ORDER — ACETAMINOPHEN 325 MG/1
650 TABLET ORAL EVERY 6 HOURS PRN
Status: DISCONTINUED | OUTPATIENT
Start: 2020-04-01 | End: 2020-04-03 | Stop reason: HOSPADM

## 2020-04-01 RX ADMIN — ACETAMINOPHEN 650 MG: 325 TABLET ORAL at 12:04

## 2020-04-01 RX ADMIN — POTASSIUM CHLORIDE 40 MEQ: 20 TABLET, EXTENDED RELEASE ORAL at 09:04

## 2020-04-01 RX ADMIN — POTASSIUM CHLORIDE 40 MEQ: 20 TABLET, EXTENDED RELEASE ORAL at 10:04

## 2020-04-01 RX ADMIN — GUAIFENESIN 600 MG: 600 TABLET, EXTENDED RELEASE ORAL at 09:04

## 2020-04-01 RX ADMIN — ACETAMINOPHEN 650 MG: 325 TABLET ORAL at 06:04

## 2020-04-01 RX ADMIN — ALBUTEROL SULFATE 2 PUFF: 90 AEROSOL, METERED RESPIRATORY (INHALATION) at 03:04

## 2020-04-01 RX ADMIN — ATORVASTATIN CALCIUM 40 MG: 40 TABLET, FILM COATED ORAL at 10:04

## 2020-04-01 RX ADMIN — PIPERACILLIN AND TAZOBACTAM 3.38 G: 3; .375 INJECTION, POWDER, FOR SOLUTION INTRAVENOUS at 10:04

## 2020-04-01 RX ADMIN — BENZONATATE 100 MG: 100 CAPSULE ORAL at 05:04

## 2020-04-01 RX ADMIN — ALBUTEROL SULFATE 2 PUFF: 90 AEROSOL, METERED RESPIRATORY (INHALATION) at 08:04

## 2020-04-01 RX ADMIN — GUAIFENESIN 600 MG: 600 TABLET, EXTENDED RELEASE ORAL at 10:04

## 2020-04-01 RX ADMIN — LOPERAMIDE HYDROCHLORIDE 2 MG: 2 CAPSULE ORAL at 05:04

## 2020-04-01 RX ADMIN — PIPERACILLIN AND TAZOBACTAM 3.38 G: 3; .375 INJECTION, POWDER, FOR SOLUTION INTRAVENOUS at 12:04

## 2020-04-01 RX ADMIN — ENOXAPARIN SODIUM 40 MG: 100 INJECTION SUBCUTANEOUS at 05:04

## 2020-04-01 RX ADMIN — HYDROXYCHLOROQUINE SULFATE 400 MG: 200 TABLET, FILM COATED ORAL at 09:04

## 2020-04-01 RX ADMIN — LOPERAMIDE HYDROCHLORIDE 2 MG: 2 CAPSULE ORAL at 09:04

## 2020-04-01 RX ADMIN — PIPERACILLIN AND TAZOBACTAM 3.38 G: 3; .375 INJECTION, POWDER, FOR SOLUTION INTRAVENOUS at 04:04

## 2020-04-01 NOTE — PROGRESS NOTES
Progress Note  Patient Name: Morelia Geiger  MRN: [id number]  Admit Date: 3/24/2020   LOS: 7 days      SUBJECTIVE:     Principle problem: Acute respiratory disease due to COVID-19 virus    Interval history:  Afebrile overnight.  Endorses exertional dyspnea; O2 sats dropped to 85% on ambulation in the room.  Still having cough with whitish sputum.   Scheduled Meds:   albuterol  2 puff Inhalation Q8H    atorvastatin  40 mg Oral QHS    enoxaparin  40 mg Subcutaneous Daily    guaiFENesin  600 mg Oral BID    hydroxychloroquine  400 mg Oral Daily    piperacillin-tazobactam (ZOSYN) IVPB  3.375 g Intravenous Q8H    potassium chloride  40 mEq Oral BID     Continuous Infusions:  PRN Meds:acetaminophen, benzonatate, bisacodyL, calcium chloride IVPB, calcium chloride IVPB, calcium chloride IVPB, hydrocodone-chlorpheniramine, hydrOXYzine pamoate, loperamide, magnesium oxide, magnesium oxide, magnesium oxide, magnesium sulfate IVPB, magnesium sulfate IVPB, magnesium sulfate IVPB, magnesium sulfate IVPB, melatonin, ondansetron, potassium chloride in water, potassium chloride in water, potassium chloride in water, potassium chloride in water, potassium chloride 10%, potassium chloride 10%, potassium chloride 10%, potassium chloride, potassium chloride, potassium chloride, potassium chloride, potassium, sodium phosphates, potassium, sodium phosphates, potassium, sodium phosphates, promethazine (PHENERGAN) IVPB, sodium chloride 0.9%, sodium phosphate IVPB, sodium phosphate IVPB, sodium phosphate IVPB, sodium phosphate IVPB, sodium phosphate IVPB    Review of patient's allergies indicates:   Allergen Reactions    Sulfasalazine Itching       Review of Systems  As per subjective  Constitutional: Negative for chills, diaphoresis, fatigue, fever and unexpected weight change.   HENT: Negative for congestion, ear pain, postnasal drip, rhinorrhea, sinus pressure, sneezing, sore throat, trouble swallowing and voice change.    Eyes:  Negative for pain, discharge, itching and visual disturbance.   Respiratory: Negative for  chest tightness, wheezing and stridor.    Cardiovascular: Negative for chest pain, palpitations and leg swelling.   Gastrointestinal: Negative for abdominal pain, blood in stool, constipation, diarrhea, nausea and vomiting.   Endocrine: Negative for cold intolerance, heat intolerance, polydipsia, polyphagia and polyuria.   Genitourinary: Negative for difficulty urinating, dysuria, flank pain, frequency, hematuria and urgency.   Musculoskeletal: Negative for arthralgias, back pain, joint swelling and myalgias.   Skin: Negative for pallor, rash and wound.   Neurological: Negative for dizziness, seizures, syncope, weakness, light-headedness and headaches.      OBJECTIVE:     Vital Signs (Most Recent)  Temp: 98 °F (36.7 °C) (04/01/20 1159)  Pulse: 85 (04/01/20 1159)  Resp: (!) 22 (04/01/20 1159)  BP: 131/70 (04/01/20 1159)  SpO2: (!) 92 % (04/01/20 1159)    Vital Signs Range (Last 24H):  Temp:  [97.5 °F (36.4 °C)-98.6 °F (37 °C)]   Pulse:  []   Resp:  [17-22]   BP: (105-131)/(61-84)   SpO2:  [92 %-100 %]     I & O (Last 24H):    Intake/Output Summary (Last 24 hours) at 4/1/2020 1428  Last data filed at 4/1/2020 0600  Gross per 24 hour   Intake 790 ml   Output --   Net 790 ml       Physical Exam:  General: Patient resting comfortably in no acute distress. Appears as stated age. Calm. Well appearing  Eyes: EOM intact. No conjunctivae injection. No scleral icterus.  ENT: Hearing grossly intact. No discharge from ears. No nasal discharge.   CVS: RRR. No LE edema BL.  Lungs: Diminished at the bases. No accessory muscle use. No acute respiratory distress.  Neuro: AOx3. Moves all extremities equally. Follows commands. Responds appropriately     Laboratory:  BMP:   Recent Labs   Lab 04/01/20  0780   GLU 92      K 3.7      CO2 27   BUN 12   CREATININE 0.7   CALCIUM 8.6*   MG 2.3     CMP:   Recent Labs   Lab 04/01/20  0732    GLU 92   CALCIUM 8.6*   ALBUMIN 3.0*   PROT 7.7      K 3.7   CO2 27      BUN 12   CREATININE 0.7   ALKPHOS 35*   ALT 18   AST 30   BILITOT 0.8         ASSESSMENT/PLAN:     39-year-old female no significant past pain history admitted for acute hypoxic respiratory failure due to COVID-19.    Active Hospital Problems    Diagnosis    *Acute respiratory disease due to COVID-19 virus    Pneumonia      Plan:  Supplemental oxygen, wean as tolerated  Continue hydroxychloroquine as per ID  Also on piperacillin-tazobactam for present of bacterial superinfection  Added benzonatate p.r.n. for cough  Airborne and contact precautions  G6PD WNL  Atorvastatin  Daily procalcitonin, CRP. Added D-dimer for tmrw AM   Repeat CXR tmrw AM if no significant improvement   BCx NGTD      VTE Risk Mitigation (From admission, onward)         Ordered     enoxaparin injection 40 mg  Daily      03/25/20 0128     IP VTE HIGH RISK PATIENT  Once      03/25/20 0128              Department Hospital Medicine  [facility]  Dayne Emmanuel MD  Date of service: 04/01/2020

## 2020-04-01 NOTE — PLAN OF CARE
03/31/20 2000   Patient Assessment/Suction   Level of Consciousness (AVPU) alert   Respiratory Effort Unlabored   Expansion/Accessory Muscles/Retractions no use of accessory muscles   All Lung Fields Breath Sounds diminished   Rhythm/Pattern, Respiratory unlabored   Cough Frequency infrequent   Cough Type nonproductive   PRE-TX-O2   O2 Device (Oxygen Therapy) nasal cannula   Flow (L/min) 5  (decreased nc from 5 to 3lpm)   SpO2 100 %   Pulse 86   Resp 18   Aerosol Therapy   $ Aerosol Therapy Charges Aerosol Treatment   Daily Review of Necessity (SVN) completed   Respiratory Treatment Status (SVN) given   Treatment Route (SVN) mouthpiece   Patient Position (SVN) HOB elevated   Post Treatment Assessment (SVN) breath sounds improved   Signs of Intolerance (SVN) none   Breath Sounds Post-Respiratory Treatment   Throughout All Fields Post-Treatment All Fields   Throughout All Fields Post-Treatment clear   Post-treatment Heart Rate (beats/min) 88   Post-treatment Resp Rate (breaths/min) 18

## 2020-04-01 NOTE — PROGRESS NOTES
"Progress Note  Infectious Disease      Reason for Consult:  COVID 19    HPI: Morelia Geiger is a  39 y.o. female : per H and P "39 year old female former smoker with no significant medical history. She presented to the ED with persistent moderate to severe nonproductive cough, associated with fever and mild-moderate shortness of breath for a week. She also reports poor appetite with mild-moderate nausea and some loose stools. She has mid-moderate mid chest pain with cough. She denies sick contacts or recent traveling. 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 She has required increase in oxygen requirement to 3.5 liters of oxygen  With sat 93% and  her CXR has worsened. Still reaching 101 degrees daily and WBC remain low, procal normal, BNP normal.  Chart reviewed and d/w Dr. Velazco.      3/28: INTERPROFRESSIONAL E/M, remote review.  Temps low grade. Sat 90% on 2 liters(was on 3.5 liters with sat 94% yesterday) hgb down 1g, renal and hepatic tests stable. CRP higher. resp notes reviewed. QTc 405 on monitor, K 2.9  3/29: temps still hectic. Overall labs are stable, though CRP is a little higher.  oxygen needs are stable  3/30: still febrile. Oxygen requirement a little higher today. CXR with increased LLL infiltrate. Ferritin less than 300.     03/31/2020   Chart reviewed.Afebrile. On 5 L.  WBC of 5.  On Zithromax and Zosyn.  Has completed 6 days of hydroxychloroquine and 7 days of Zithromax.  Nothing new in cultures.  Chest x-ray of 0330 had bilateral lung opacities.  CRP is ranging about the same 5-7--6.9 -6.9  Will add ferritin for a.m.     04/01/2020  Yesterday O2 sat 100% on 5 L O2; trying to decrease as per respiratory therapist documentation patient is on 21% O2 at this time..    EXAM & DIAGNOSTICS REVIEWED:   Vitals:     Temp:  [97.5 °F (36.4 °C)-98.6 °F (37 °C)]   Temp: 98 °F (36.7 °C) (04/01/20 1159)  Pulse: 85 (04/01/20 " 1159)  Resp: (!) 22 (04/01/20 1159)  BP: 131/70 (04/01/20 1159)  SpO2: (!) 92 % (04/01/20 1159)    Intake/Output Summary (Last 24 hours) at 4/1/2020 1435  Last data filed at 4/1/2020 0600  Gross per 24 hour   Intake 790 ml   Output --   Net 790 ml      Oxygen Concentration (%):  [21] 21    General Labs reviewed:  Recent Labs   Lab 03/30/20  0607 03/31/20  0533 04/01/20  0727   WBC 6.83 5.97 5.31   HGB 10.4* 10.0* 10.2*   HCT 33.4* 32.4* 33.0*    326 350       Recent Labs   Lab 03/30/20  0607 03/31/20  0533 04/01/20  0727    138 140   K 3.4* 3.1* 3.7    101 104   CO2 22* 25 27   BUN 11 11 12   CREATININE 0.9 0.9 0.7   CALCIUM 8.2* 8.4* 8.6*   PROT 7.5 7.3 7.7   BILITOT 1.0 1.1* 0.8   ALKPHOS 33* 32* 35*   ALT 21 18 18   AST 44* 35 30           Micro:  Microbiology Results (last 7 days)     Procedure Component Value Units Date/Time    Blood culture x two cultures. Draw prior to antibiotics. [[id number]] Collected:  03/24/20 2351    Order Status:  Completed Specimen:  Blood from Peripheral, Forearm, Left Updated:  03/30/20 0232     Blood Culture, Routine No growth after 5 days.    Narrative:       Aerobic and anaerobic    Blood culture x two cultures. Draw prior to antibiotics. [[id number]] Collected:  03/24/20 2323    Order Status:  Completed Specimen:  Blood from Peripheral, Upper Arm, Right Updated:  03/30/20 0232     Blood Culture, Routine No growth after 5 days.    Narrative:       Aerobic and anaerobic        Imaging Reviewed:   CXR03/30No significant change of bilateral patchy lung opacities.      Cardiology:    IMPRESSION & PLAN   1.   COVID pneumonitis  2. Hypoxemia, fever, abnormal CXR    - This is a remote interprofessional consult due to COVID epidemic.  More than 31 minutes are spent for medical consultative verbal and/or internet discussion, review of pertinent medical records, lab/imaging studies, medication profile, etc    flu test : 3/24 neg  Procalcitonin: 0.37, 0.05, 0.05  COVID 19:   3/24 positive  G6PD: normal  QTC baseline:  441  QTC serial: 405, 425  HIV Ab :  Troponin: normal, 0.03  BNP :  Ferritin : 292---303  CRP 6.09  Hydroxychloroquine started : 3/24  Darunavir/CbST started  :  azithromycin started: 3/24  Remdesivir  started :    - This is a remote interprofessional consult due to COVID epidemic.  More than 31 minutes are spent for medical consultative verbal and/or internet discussion, review of pertinent medical records, lab/imaging studies, medication profile, etc    Recommendations:  d7  zithromax daily (250 mg )  d7 HCQ   Complete 10 days of treatment   telemetry to monitor QTc, QTC today is 404.  d3 of zosyn for  LLL infiltrate     Will sign off  Please call with questions

## 2020-04-02 PROBLEM — J12.82 PNEUMONIA DUE TO COVID-19 VIRUS: Status: ACTIVE | Noted: 2020-03-25

## 2020-04-02 LAB
ALBUMIN SERPL BCP-MCNC: 3 G/DL (ref 3.5–5.2)
ALP SERPL-CCNC: 42 U/L (ref 55–135)
ALT SERPL W/O P-5'-P-CCNC: 22 U/L (ref 10–44)
ANION GAP SERPL CALC-SCNC: 12 MMOL/L (ref 8–16)
AST SERPL-CCNC: 32 U/L (ref 10–40)
BASOPHILS # BLD AUTO: 0.02 K/UL (ref 0–0.2)
BASOPHILS NFR BLD: 0.3 % (ref 0–1.9)
BILIRUB SERPL-MCNC: 0.9 MG/DL (ref 0.1–1)
BUN SERPL-MCNC: 12 MG/DL (ref 6–20)
CALCIUM SERPL-MCNC: 8.8 MG/DL (ref 8.7–10.5)
CHLORIDE SERPL-SCNC: 103 MMOL/L (ref 95–110)
CO2 SERPL-SCNC: 25 MMOL/L (ref 23–29)
CREAT SERPL-MCNC: 0.8 MG/DL (ref 0.5–1.4)
CRP SERPL-MCNC: 5.33 MG/DL (ref 0–0.75)
D DIMER PPP IA.FEU-MCNC: 0.78 UG/ML FEU
DIFFERENTIAL METHOD: ABNORMAL
EOSINOPHIL # BLD AUTO: 0.2 K/UL (ref 0–0.5)
EOSINOPHIL NFR BLD: 3.3 % (ref 0–8)
ERYTHROCYTE [DISTWIDTH] IN BLOOD BY AUTOMATED COUNT: 15.2 % (ref 11.5–14.5)
EST. GFR  (AFRICAN AMERICAN): >60 ML/MIN/1.73 M^2
EST. GFR  (NON AFRICAN AMERICAN): >60 ML/MIN/1.73 M^2
GLUCOSE SERPL-MCNC: 83 MG/DL (ref 70–110)
HCT VFR BLD AUTO: 32.3 % (ref 37–48.5)
HGB BLD-MCNC: 9.9 G/DL (ref 12–16)
IMM GRANULOCYTES # BLD AUTO: 0.05 K/UL (ref 0–0.04)
IMM GRANULOCYTES NFR BLD AUTO: 0.8 % (ref 0–0.5)
LYMPHOCYTES # BLD AUTO: 1.5 K/UL (ref 1–4.8)
LYMPHOCYTES NFR BLD: 24.9 % (ref 18–48)
MAGNESIUM SERPL-MCNC: 2.3 MG/DL (ref 1.6–2.6)
MCH RBC QN AUTO: 25.4 PG (ref 27–31)
MCHC RBC AUTO-ENTMCNC: 30.7 G/DL (ref 32–36)
MCV RBC AUTO: 83 FL (ref 82–98)
MONOCYTES # BLD AUTO: 0.5 K/UL (ref 0.3–1)
MONOCYTES NFR BLD: 8.4 % (ref 4–15)
NEUTROPHILS # BLD AUTO: 3.7 K/UL (ref 1.8–7.7)
NEUTROPHILS NFR BLD: 62.3 % (ref 38–73)
NRBC BLD-RTO: 0 /100 WBC
PHOSPHATE SERPL-MCNC: 2.8 MG/DL (ref 2.7–4.5)
PLATELET # BLD AUTO: 387 K/UL (ref 150–350)
PMV BLD AUTO: 10.5 FL (ref 9.2–12.9)
POTASSIUM SERPL-SCNC: 4.1 MMOL/L (ref 3.5–5.1)
PROCALCITONIN SERPL IA-MCNC: <0.05 NG/ML (ref 0–0.5)
PROT SERPL-MCNC: 7.3 G/DL (ref 6–8.4)
RBC # BLD AUTO: 3.9 M/UL (ref 4–5.4)
SODIUM SERPL-SCNC: 140 MMOL/L (ref 136–145)
TROPONIN I SERPL DL<=0.01 NG/ML-MCNC: <0.03 NG/ML
WBC # BLD AUTO: 5.98 K/UL (ref 3.9–12.7)

## 2020-04-02 PROCEDURE — 85025 COMPLETE CBC W/AUTO DIFF WBC: CPT

## 2020-04-02 PROCEDURE — 80053 COMPREHEN METABOLIC PANEL: CPT

## 2020-04-02 PROCEDURE — 25000003 PHARM REV CODE 250: Performed by: INTERNAL MEDICINE

## 2020-04-02 PROCEDURE — 63600175 PHARM REV CODE 636 W HCPCS: Performed by: NURSE PRACTITIONER

## 2020-04-02 PROCEDURE — 63600175 PHARM REV CODE 636 W HCPCS: Performed by: FAMILY MEDICINE

## 2020-04-02 PROCEDURE — 86140 C-REACTIVE PROTEIN: CPT

## 2020-04-02 PROCEDURE — 84100 ASSAY OF PHOSPHORUS: CPT

## 2020-04-02 PROCEDURE — 94761 N-INVAS EAR/PLS OXIMETRY MLT: CPT

## 2020-04-02 PROCEDURE — 94640 AIRWAY INHALATION TREATMENT: CPT

## 2020-04-02 PROCEDURE — 36415 COLL VENOUS BLD VENIPUNCTURE: CPT

## 2020-04-02 PROCEDURE — 63700000 PHARM REV CODE 250 ALT 637 W/O HCPCS: Performed by: INTERNAL MEDICINE

## 2020-04-02 PROCEDURE — 84484 ASSAY OF TROPONIN QUANT: CPT

## 2020-04-02 PROCEDURE — 85379 FIBRIN DEGRADATION QUANT: CPT

## 2020-04-02 PROCEDURE — 25000003 PHARM REV CODE 250: Performed by: NURSE PRACTITIONER

## 2020-04-02 PROCEDURE — 25000003 PHARM REV CODE 250: Performed by: FAMILY MEDICINE

## 2020-04-02 PROCEDURE — 83735 ASSAY OF MAGNESIUM: CPT

## 2020-04-02 PROCEDURE — 27000221 HC OXYGEN, UP TO 24 HOURS

## 2020-04-02 PROCEDURE — 99900035 HC TECH TIME PER 15 MIN (STAT)

## 2020-04-02 PROCEDURE — 84145 PROCALCITONIN (PCT): CPT

## 2020-04-02 PROCEDURE — 12000002 HC ACUTE/MED SURGE SEMI-PRIVATE ROOM

## 2020-04-02 RX ORDER — AZITHROMYCIN 250 MG/1
250 TABLET, FILM COATED ORAL DAILY
Status: DISCONTINUED | OUTPATIENT
Start: 2020-04-02 | End: 2020-04-03 | Stop reason: HOSPADM

## 2020-04-02 RX ADMIN — HYDROXYCHLOROQUINE SULFATE 400 MG: 200 TABLET, FILM COATED ORAL at 08:04

## 2020-04-02 RX ADMIN — LOPERAMIDE HYDROCHLORIDE 2 MG: 2 CAPSULE ORAL at 10:04

## 2020-04-02 RX ADMIN — BENZONATATE 100 MG: 100 CAPSULE ORAL at 10:04

## 2020-04-02 RX ADMIN — PIPERACILLIN AND TAZOBACTAM 3.38 G: 3; .375 INJECTION, POWDER, FOR SOLUTION INTRAVENOUS at 06:04

## 2020-04-02 RX ADMIN — AZITHROMYCIN 250 MG: 250 TABLET, FILM COATED ORAL at 10:04

## 2020-04-02 RX ADMIN — ATORVASTATIN CALCIUM 40 MG: 40 TABLET, FILM COATED ORAL at 10:04

## 2020-04-02 RX ADMIN — GUAIFENESIN 600 MG: 600 TABLET, EXTENDED RELEASE ORAL at 08:04

## 2020-04-02 RX ADMIN — ALBUTEROL SULFATE 2 PUFF: 90 AEROSOL, METERED RESPIRATORY (INHALATION) at 12:04

## 2020-04-02 RX ADMIN — ALBUTEROL SULFATE 2 PUFF: 90 AEROSOL, METERED RESPIRATORY (INHALATION) at 08:04

## 2020-04-02 RX ADMIN — GUAIFENESIN 600 MG: 600 TABLET, EXTENDED RELEASE ORAL at 10:04

## 2020-04-02 RX ADMIN — PIPERACILLIN AND TAZOBACTAM 3.38 G: 3; .375 INJECTION, POWDER, FOR SOLUTION INTRAVENOUS at 10:04

## 2020-04-02 RX ADMIN — ENOXAPARIN SODIUM 40 MG: 100 INJECTION SUBCUTANEOUS at 04:04

## 2020-04-02 RX ADMIN — PIPERACILLIN AND TAZOBACTAM 3.38 G: 3; .375 INJECTION, POWDER, FOR SOLUTION INTRAVENOUS at 01:04

## 2020-04-02 RX ADMIN — POTASSIUM CHLORIDE 40 MEQ: 20 TABLET, EXTENDED RELEASE ORAL at 09:04

## 2020-04-02 RX ADMIN — POTASSIUM CHLORIDE 40 MEQ: 20 TABLET, EXTENDED RELEASE ORAL at 08:04

## 2020-04-02 NOTE — PROGRESS NOTES
"[facility]  Adult Nutrition   Progress Note (Follow-Up)    SUMMARY     Recommendations  Recommendation/Intervention: 1. Continue diet as tolerated by pt 2. Encourage PO intake of meals/snacks 3.  to assist with meal choices daily  Goals:  1. Pt to meet at least 75% estimated needs via PO diet   Nutrition Goal Status: new  Communication of RD Recs: reviewed with RN    Dietitian Rounds Brief    Pt seen for f/u. Intake remains minimal. Consuming mostly fruit per RN. No N/V. LBM 4/1. No specific prefs voiced. RD to continue to follow. Encourage PO intake as able.    Reason for Assessment  Reason For Assessment: RD follow-up  Diagnosis: pulmonary disease  Relevant Medical History: Graves dz    Nutrition Risk Screen  Nutrition Risk Screen: no indicators present     MST Score: 1  Have you recently lost weight without trying?: No  Weight loss score: 0  Have you been eating poorly because of a decreased appetite?: Yes  Appetite score: 1       Nutrition/Diet History  Spiritual, Cultural Beliefs, Sabianism Practices, Values that Affect Care: no  Food Allergies: NKFA  Factors Affecting Nutritional Intake: decreased appetite    Anthropometrics  Temp: 98.2 °F (36.8 °C)  Height Method: Stated  Height: 5' 8" (172.7 cm)  Height (inches): 68 in  Weight Method: Bed Scale  Weight: 117.9 kg (260 lb)  Weight (lb): 260 lb  Ideal Body Weight (IBW), Female: 140 lb  % Ideal Body Weight, Female (lb): 185.71 %  BMI (Calculated): 39.5  BMI Grade: 35 - 39.9 - obesity - grade II       Weight History:  Wt Readings from Last 10 Encounters:   03/30/20 117.9 kg (260 lb)   06/30/17 114.6 kg (252 lb 10.4 oz)       Lab/Procedures/Meds: Pertinent Labs Reviewed  Clinical Chemistry:  Recent Labs   Lab 03/31/20  0533 04/01/20  0727 04/02/20  0603    140 140   K 3.1* 3.7 4.1    104 103   CO2 25 27 25   GLU 93 92 83   BUN 11 12 12   CREATININE 0.9 0.7 0.8   CALCIUM 8.4* 8.6* 8.8   PROT 7.3 7.7 7.3   ALBUMIN 3.0* 3.0* 3.0* "   BILITOT 1.1* 0.8 0.9   ALKPHOS 32* 35* 42*   AST 35 30 32   ALT 18 18 22   ANIONGAP 12 9 12   ESTGFRAFRICA >60.0 >60.0 >60.0   EGFRNONAA >60.0 >60.0 >60.0   MG 2.5 2.3 2.3   PHOS 3.6 2.8 2.8     CBC:   Recent Labs   Lab 04/02/20  0603   WBC 5.98   RBC 3.90*   HGB 9.9*   HCT 32.3*   *   MCV 83   MCH 25.4*   MCHC 30.7*     Cardiac Profile:  Recent Labs   Lab 03/28/20  0628  03/31/20  0533 04/01/20  0727 04/02/20  0603   BNP 6  --   --   --   --    TROPONINI <0.030   < > <0.030 <0.030 <0.030    < > = values in this interval not displayed.     Inflammatory Labs:  Recent Labs   Lab 03/31/20 0533 04/01/20 0727 04/02/20  0603   CRP 6.99* 6.09* 5.33*       Medications: Pertinent Medications reviewed  Scheduled Meds:   albuterol  2 puff Inhalation Q8H    atorvastatin  40 mg Oral QHS    azithromycin  250 mg Oral Daily    enoxaparin  40 mg Subcutaneous Daily    guaiFENesin  600 mg Oral BID    hydroxychloroquine  400 mg Oral Daily    piperacillin-tazobactam (ZOSYN) IVPB  3.375 g Intravenous Q8H    potassium chloride  40 mEq Oral BID     Continuous Infusions:  PRN Meds:.acetaminophen, benzonatate, bisacodyL, calcium chloride IVPB, calcium chloride IVPB, calcium chloride IVPB, hydrocodone-chlorpheniramine, hydrOXYzine pamoate, loperamide, magnesium oxide, magnesium oxide, magnesium oxide, magnesium sulfate IVPB, magnesium sulfate IVPB, magnesium sulfate IVPB, magnesium sulfate IVPB, melatonin, ondansetron, potassium chloride in water, potassium chloride in water, potassium chloride in water, potassium chloride in water, potassium chloride 10%, potassium chloride 10%, potassium chloride 10%, potassium chloride, potassium chloride, potassium chloride, potassium chloride, potassium, sodium phosphates, potassium, sodium phosphates, potassium, sodium phosphates, promethazine (PHENERGAN) IVPB, sodium chloride 0.9%, sodium phosphate IVPB, sodium phosphate IVPB, sodium phosphate IVPB, sodium phosphate IVPB, sodium  phosphate IVPB    Estimated/Assessed Needs  Weight Used For Calorie Calculations: 117.9 kg (259 lb 14.8 oz)  Energy Calorie Requirements (kcal): 6469-3594 (15-20 kcal/kg)  Energy Need Method: Kcal/kg  Protein Requirements: 96 g (1.5 g/kg) per IBW  Weight Used For Protein Calculations: 64 kg (141 lb 1.5 oz)(IBW)     Estimated Fluid Requirement Method: RDA Method  RDA Method (mL): 1170       Nutrition Prescription Ordered  Current Diet Order: regular     Evaluation of Received Nutrient/Fluid Intake  Energy Calories Required: not meeting needs  Protein Required: not meeting needs  Fluid Required: meeting needs  Tolerance: tolerating   No intake or output data in the 24 hours ending 04/02/20 1329     % Meal Intake: Other: 0-100% (varied each meal)    Nutrition Risk  Level of Risk/Frequency of Follow-up: high     Monitor and Evaluation  Food and Nutrient Intake: food and beverage intake, energy intake  Food and Nutrient Adminstration: diet order  Physical Activity and Function: nutrition-related ADLs and IADLs  Anthropometric Measurements: weight change, weight  Biochemical Data, Medical Tests and Procedures: electrolyte and renal panel, gastrointestinal profile, glucose/endocrine profile  Nutrition-Focused Physical Findings: overall appearance     Nutrition Follow-Up  RD Follow-up?: Yes    Margot Chavez RD 04/02/2020 1:29 PM

## 2020-04-02 NOTE — PROGRESS NOTES
Progress Note  Patient Name: Morelia Geiger  MRN: [id number]  Admit Date: 3/24/2020   LOS: 8 days      SUBJECTIVE:     Principle problem: Pneumonia due to COVID-19 virus    Interval history:  Afebrile overnight.  Improving clinically; still has some exertional dyspnea and intermittent productive cough. No chest pain.    Scheduled Meds:   albuterol  2 puff Inhalation Q8H    atorvastatin  40 mg Oral QHS    azithromycin  250 mg Oral Daily    enoxaparin  40 mg Subcutaneous Daily    guaiFENesin  600 mg Oral BID    hydroxychloroquine  400 mg Oral Daily    piperacillin-tazobactam (ZOSYN) IVPB  3.375 g Intravenous Q8H    potassium chloride  40 mEq Oral BID     Continuous Infusions:  PRN Meds:acetaminophen, benzonatate, bisacodyL, calcium chloride IVPB, calcium chloride IVPB, calcium chloride IVPB, hydrocodone-chlorpheniramine, hydrOXYzine pamoate, loperamide, magnesium oxide, magnesium oxide, magnesium oxide, magnesium sulfate IVPB, magnesium sulfate IVPB, magnesium sulfate IVPB, magnesium sulfate IVPB, melatonin, ondansetron, potassium chloride in water, potassium chloride in water, potassium chloride in water, potassium chloride in water, potassium chloride 10%, potassium chloride 10%, potassium chloride 10%, potassium chloride, potassium chloride, potassium chloride, potassium chloride, potassium, sodium phosphates, potassium, sodium phosphates, potassium, sodium phosphates, promethazine (PHENERGAN) IVPB, sodium chloride 0.9%, sodium phosphate IVPB, sodium phosphate IVPB, sodium phosphate IVPB, sodium phosphate IVPB, sodium phosphate IVPB    Review of patient's allergies indicates:   Allergen Reactions    Sulfasalazine Itching       Review of Systems  As per subjective    OBJECTIVE:     Vital Signs (Most Recent)  Temp: 98.4 °F (36.9 °C) (04/02/20 1616)  Pulse: 84 (04/02/20 1616)  Resp: 18 (04/02/20 1616)  BP: 114/68 (04/02/20 1616)  SpO2: 99 % (04/02/20 1616)    Vital Signs Range (Last 24H):  Temp:  [97.3 °F  (36.3 °C)-98.5 °F (36.9 °C)]   Pulse:  []   Resp:  [16-19]   BP: (110-138)/(60-72)   SpO2:  [94 %-99 %]     I & O (Last 24H):    Intake/Output Summary (Last 24 hours) at 4/2/2020 1815  Last data filed at 4/2/2020 1805  Gross per 24 hour   Intake 240 ml   Output --   Net 240 ml       Physical Exam:  General: Patient resting comfortably in no acute distress. Appears as stated age. Calm. Well appearing  Eyes: EOM intact. No conjunctivae injection. No scleral icterus.  ENT: Hearing grossly intact. No discharge from ears. No nasal discharge.   CVS: RRR. No LE edema BL.  Lungs: Diminished at the bases. No accessory muscle use. No acute respiratory distress.  Neuro: AOx3. Moves all extremities equally. Follows commands. Responds appropriately     Laboratory:  BMP:   Recent Labs   Lab 04/02/20  0603   GLU 83      K 4.1      CO2 25   BUN 12   CREATININE 0.8   CALCIUM 8.8   MG 2.3     CMP:   Recent Labs   Lab 04/02/20  0603   GLU 83   CALCIUM 8.8   ALBUMIN 3.0*   PROT 7.3      K 4.1   CO2 25      BUN 12   CREATININE 0.8   ALKPHOS 42*   ALT 22   AST 32   BILITOT 0.9         ASSESSMENT/PLAN:     39-year-old female no significant past pain history admitted for acute hypoxic respiratory failure due to COVID-19.    Active Hospital Problems    Diagnosis    *Pneumonia due to COVID-19 virus    Pneumonia      Plan:  Supplemental oxygen, wean as tolerated  Continue hydroxychloroquine and azithromycin as per ID (day 7/10); QTc today is 426 ms  Also on piperacillin-tazobactam for present of bacterial superinfection  Prn anti-tussives   Airborne and contact precautions  G6PD WNL  Atorvastatin  BCx NGTD    Disposition:  Anticipate discharge to home tomorrow    VTE Risk Mitigation (From admission, onward)         Ordered     enoxaparin injection 40 mg  Daily      03/25/20 0128     IP VTE HIGH RISK PATIENT  Once      03/25/20 0128              Department Hospital Medicine  [facility]  MD [place]  Date of service: 04/02/2020

## 2020-04-02 NOTE — PLAN OF CARE
04/02/20 0835   Patient Assessment/Suction   Level of Consciousness (AVPU) alert   Respiratory Effort Normal;Unlabored   Expansion/Accessory Muscles/Retractions expansion symmetric;no use of accessory muscles   All Lung Fields Breath Sounds clear;diminished   Rhythm/Pattern, Respiratory pattern regular   Cough Frequency frequent   Cough Type nonproductive   PRE-TX-O2   O2 Device (Oxygen Therapy) nasal cannula   $ Is the patient on Low Flow Oxygen? Yes   Flow (L/min) 2   SpO2 95 %   Pulse Oximetry Type Intermittent   $ Pulse Oximetry - Multiple Charge Pulse Oximetry - Multiple   Pulse 104   Resp 19   Positioning HOB elevated 45 degrees   Inhaler   $ Inhaler Charges MDI (Metered Dose Inahler) Treatment   Daily Review of Necessity (Inhaler) completed   Respiratory Treatment Status (Inhaler) given   Treatment Route (Inhaler) spacer/holding chamber;breath hold   Patient Position (Inhaler) HOB elevated   Post Treatment Assessment (Inhaler) breath sounds unchanged   Signs of Intolerance (Inhaler) excessive cough   Respiratory Evaluation   $ Care Plan Tech Time 15 min

## 2020-04-02 NOTE — PLAN OF CARE
Problem: Adult Inpatient Plan of Care  Goal: Plan of Care Review  Outcome: Ongoing, Progressing  Goal: Patient-Specific Goal (Individualization)  Outcome: Ongoing, Progressing  Goal: Absence of Hospital-Acquired Illness or Injury  Outcome: Ongoing, Progressing  Goal: Optimal Comfort and Wellbeing  Outcome: Ongoing, Progressing  Goal: Readiness for Transition of Care  Outcome: Ongoing, Progressing  Goal: Rounds/Family Conference  Outcome: Ongoing, Progressing     Problem: Fall Injury Risk  Goal: Absence of Fall and Fall-Related Injury  Outcome: Ongoing, Progressing     Problem: Fluid Imbalance (Pneumonia)  Goal: Fluid Balance  Outcome: Ongoing, Progressing     Problem: Infection (Pneumonia)  Goal: Resolution of Infection Signs/Symptoms  Outcome: Ongoing, Progressing     Problem: Respiratory Compromise (Pneumonia)  Goal: Effective Oxygenation and Ventilation  Outcome: Ongoing, Progressing     Problem: Oral Intake Inadequate  Goal: Improved Oral Intake  Outcome: Ongoing, Progressing     Problem: Infection  Goal: Infection Symptom Resolution  Outcome: Ongoing, Progressing

## 2020-04-02 NOTE — PLAN OF CARE
Problem: Adult Inpatient Plan of Care  Goal: Plan of Care Review  Outcome: Ongoing, Progressing     Problem: Adult Inpatient Plan of Care  Goal: Absence of Hospital-Acquired Illness or Injury  Outcome: Ongoing, Progressing     Problem: Adult Inpatient Plan of Care  Goal: Optimal Comfort and Wellbeing  Outcome: Ongoing, Progressing     Problem: Fall Injury Risk  Goal: Absence of Fall and Fall-Related Injury  Outcome: Ongoing, Progressing     Problem: Respiratory Compromise (Pneumonia)  Goal: Effective Oxygenation and Ventilation  Outcome: Ongoing, Progressing     Problem: Infection (Pneumonia)  Goal: Resolution of Infection Signs/Symptoms  Outcome: Ongoing, Progressing

## 2020-04-02 NOTE — PLAN OF CARE
04/01/20 1805   PRE-TX-O2   SpO2 96 %   Pulse 91   Resp (!) 21   Temp 97.9 °F (36.6 °C)   /70

## 2020-04-03 VITALS
RESPIRATION RATE: 16 BRPM | BODY MASS INDEX: 39.4 KG/M2 | SYSTOLIC BLOOD PRESSURE: 99 MMHG | HEART RATE: 74 BPM | DIASTOLIC BLOOD PRESSURE: 47 MMHG | WEIGHT: 260 LBS | OXYGEN SATURATION: 97 % | TEMPERATURE: 99 F | HEIGHT: 68 IN

## 2020-04-03 LAB
ALBUMIN SERPL BCP-MCNC: 3.1 G/DL (ref 3.5–5.2)
ALP SERPL-CCNC: 146 U/L (ref 55–135)
ALT SERPL W/O P-5'-P-CCNC: 74 U/L (ref 10–44)
ANION GAP SERPL CALC-SCNC: 8 MMOL/L (ref 8–16)
AST SERPL-CCNC: 115 U/L (ref 10–40)
BASOPHILS # BLD AUTO: 0.02 K/UL (ref 0–0.2)
BASOPHILS NFR BLD: 0.4 % (ref 0–1.9)
BILIRUB SERPL-MCNC: 0.9 MG/DL (ref 0.1–1)
BUN SERPL-MCNC: 11 MG/DL (ref 6–20)
CALCIUM SERPL-MCNC: 8.9 MG/DL (ref 8.7–10.5)
CHLORIDE SERPL-SCNC: 105 MMOL/L (ref 95–110)
CO2 SERPL-SCNC: 25 MMOL/L (ref 23–29)
CREAT SERPL-MCNC: 0.9 MG/DL (ref 0.5–1.4)
DIFFERENTIAL METHOD: ABNORMAL
EOSINOPHIL # BLD AUTO: 0.2 K/UL (ref 0–0.5)
EOSINOPHIL NFR BLD: 3.7 % (ref 0–8)
ERYTHROCYTE [DISTWIDTH] IN BLOOD BY AUTOMATED COUNT: 15 % (ref 11.5–14.5)
EST. GFR  (AFRICAN AMERICAN): >60 ML/MIN/1.73 M^2
EST. GFR  (NON AFRICAN AMERICAN): >60 ML/MIN/1.73 M^2
GLUCOSE SERPL-MCNC: 87 MG/DL (ref 70–110)
HCT VFR BLD AUTO: 33.7 % (ref 37–48.5)
HGB BLD-MCNC: 10.2 G/DL (ref 12–16)
IMM GRANULOCYTES # BLD AUTO: 0.08 K/UL (ref 0–0.04)
IMM GRANULOCYTES NFR BLD AUTO: 1.5 % (ref 0–0.5)
LYMPHOCYTES # BLD AUTO: 1.6 K/UL (ref 1–4.8)
LYMPHOCYTES NFR BLD: 31.3 % (ref 18–48)
MAGNESIUM SERPL-MCNC: 2.1 MG/DL (ref 1.6–2.6)
MCH RBC QN AUTO: 25.5 PG (ref 27–31)
MCHC RBC AUTO-ENTMCNC: 30.3 G/DL (ref 32–36)
MCV RBC AUTO: 84 FL (ref 82–98)
MONOCYTES # BLD AUTO: 0.5 K/UL (ref 0.3–1)
MONOCYTES NFR BLD: 9.3 % (ref 4–15)
NEUTROPHILS # BLD AUTO: 2.8 K/UL (ref 1.8–7.7)
NEUTROPHILS NFR BLD: 53.8 % (ref 38–73)
NRBC BLD-RTO: 0 /100 WBC
PLATELET # BLD AUTO: 428 K/UL (ref 150–350)
PMV BLD AUTO: 10.7 FL (ref 9.2–12.9)
POTASSIUM SERPL-SCNC: 4.2 MMOL/L (ref 3.5–5.1)
PROT SERPL-MCNC: 7.7 G/DL (ref 6–8.4)
RBC # BLD AUTO: 4 M/UL (ref 4–5.4)
SODIUM SERPL-SCNC: 138 MMOL/L (ref 136–145)
WBC # BLD AUTO: 5.18 K/UL (ref 3.9–12.7)

## 2020-04-03 PROCEDURE — 94640 AIRWAY INHALATION TREATMENT: CPT

## 2020-04-03 PROCEDURE — 36415 COLL VENOUS BLD VENIPUNCTURE: CPT

## 2020-04-03 PROCEDURE — 25000003 PHARM REV CODE 250: Performed by: FAMILY MEDICINE

## 2020-04-03 PROCEDURE — 83735 ASSAY OF MAGNESIUM: CPT

## 2020-04-03 PROCEDURE — 94761 N-INVAS EAR/PLS OXIMETRY MLT: CPT

## 2020-04-03 PROCEDURE — 25000003 PHARM REV CODE 250: Performed by: INTERNAL MEDICINE

## 2020-04-03 PROCEDURE — 27000221 HC OXYGEN, UP TO 24 HOURS

## 2020-04-03 PROCEDURE — 63700000 PHARM REV CODE 250 ALT 637 W/O HCPCS: Performed by: INTERNAL MEDICINE

## 2020-04-03 PROCEDURE — 85025 COMPLETE CBC W/AUTO DIFF WBC: CPT

## 2020-04-03 PROCEDURE — 63600175 PHARM REV CODE 636 W HCPCS: Performed by: FAMILY MEDICINE

## 2020-04-03 PROCEDURE — 80053 COMPREHEN METABOLIC PANEL: CPT

## 2020-04-03 PROCEDURE — 99900035 HC TECH TIME PER 15 MIN (STAT)

## 2020-04-03 RX ORDER — BENZONATATE 100 MG/1
100 CAPSULE ORAL 3 TIMES DAILY PRN
Qty: 30 CAPSULE | Refills: 0 | Status: SHIPPED | OUTPATIENT
Start: 2020-04-03 | End: 2020-04-13

## 2020-04-03 RX ORDER — AZITHROMYCIN 250 MG/1
250 TABLET, FILM COATED ORAL DAILY
Qty: 2 TABLET | Refills: 0 | Status: SHIPPED | OUTPATIENT
Start: 2020-04-04 | End: 2020-04-06

## 2020-04-03 RX ORDER — GUAIFENESIN 600 MG/1
600 TABLET, EXTENDED RELEASE ORAL 2 TIMES DAILY
Qty: 20 TABLET | Refills: 0 | Status: SHIPPED | OUTPATIENT
Start: 2020-04-03 | End: 2020-04-03 | Stop reason: CLARIF

## 2020-04-03 RX ORDER — HYDROXYCHLOROQUINE SULFATE 200 MG/1
400 TABLET, FILM COATED ORAL DAILY
Qty: 4 TABLET | Refills: 0 | Status: SHIPPED | OUTPATIENT
Start: 2020-04-04 | End: 2020-04-06

## 2020-04-03 RX ORDER — ALBUTEROL SULFATE 90 UG/1
2 AEROSOL, METERED RESPIRATORY (INHALATION)
Status: DISCONTINUED | OUTPATIENT
Start: 2020-04-03 | End: 2020-04-03 | Stop reason: HOSPADM

## 2020-04-03 RX ADMIN — AZITHROMYCIN 250 MG: 250 TABLET, FILM COATED ORAL at 08:04

## 2020-04-03 RX ADMIN — ALBUTEROL SULFATE 2 PUFF: 90 AEROSOL, METERED RESPIRATORY (INHALATION) at 12:04

## 2020-04-03 RX ADMIN — HYDROXYCHLOROQUINE SULFATE 400 MG: 200 TABLET, FILM COATED ORAL at 08:04

## 2020-04-03 RX ADMIN — PIPERACILLIN AND TAZOBACTAM 3.38 G: 3; .375 INJECTION, POWDER, FOR SOLUTION INTRAVENOUS at 05:04

## 2020-04-03 RX ADMIN — ALBUTEROL SULFATE 2 PUFF: 90 AEROSOL, METERED RESPIRATORY (INHALATION) at 08:04

## 2020-04-03 RX ADMIN — GUAIFENESIN 600 MG: 600 TABLET, EXTENDED RELEASE ORAL at 08:04

## 2020-04-03 NOTE — RESPIRATORY THERAPY
04/03/20 0058   Patient Assessment/Suction   Level of Consciousness (AVPU) alert   Respiratory Effort Unlabored   Expansion/Accessory Muscles/Retractions expansion symmetric;no retractions;no use of accessory muscles   All Lung Fields Breath Sounds diminished   Rhythm/Pattern, Respiratory pattern regular;unlabored   PRE-TX-O2   O2 Device (Oxygen Therapy) nasal cannula   Flow (L/min) 2   SpO2 97 %   Pulse Oximetry Type Intermittent   $ Pulse Oximetry - Multiple Charge Pulse Oximetry - Multiple   Pulse 72   Resp 18   Inhaler   $ Inhaler Charges MDI (Metered Dose Inahler) Treatment;Given With Spacer   Daily Review of Necessity (Inhaler) completed   Respiratory Treatment Status (Inhaler) given   Treatment Route (Inhaler) breath hold;mouthpiece;spacer/holding chamber   Patient Position (Inhaler) semi-Abernathy's   Post Treatment Assessment (Inhaler) breath sounds unchanged   Signs of Intolerance (Inhaler) none   Breath Sounds Post-Respiratory Treatment   Throughout All Fields Post-Treatment All Fields   Throughout All Fields Post-Treatment no change   Post-treatment Heart Rate (beats/min) 72   Post-treatment Resp Rate (breaths/min) 18   Respiratory Evaluation   $ Care Plan Tech Time 15 min

## 2020-04-03 NOTE — NURSING
Patient was given discharge instructions. Medication prescriptions were filled by [facility] pharmacy. Instructions were given to the patient regarding Covid 19 isolation. Masks were provided. Patient was escorted outside via wheel chair. Departed the hospital via private auto accompanied by spouse

## 2020-04-03 NOTE — CARE UPDATE
04/03/20 0835   Patient Assessment/Suction   Level of Consciousness (AVPU) alert   Respiratory Effort Unlabored   All Lung Fields Breath Sounds clear   Cough Frequency frequent   Cough Type nonproductive   PRE-TX-O2   O2 Device (Oxygen Therapy) nasal cannula   $ Is the patient on Low Flow Oxygen? Yes   Flow (L/min) 2   SpO2 97 %   Pulse 74   Resp 16   Inhaler   $ Inhaler Charges MDI (Metered Dose Inahler) Treatment   Daily Review of Necessity (Inhaler) completed   Respiratory Treatment Status (Inhaler) given   Treatment Route (Inhaler) spacer/holding chamber   Patient Position (Inhaler) semi-Abernathy's   Post Treatment Assessment (Inhaler) breath sounds improved

## 2020-04-03 NOTE — PLAN OF CARE
04/03/20 1155   Final Note   Assessment Type Final Discharge Note   Anticipated Discharge Disposition Home

## 2020-04-04 NOTE — DISCHARGE SUMMARY
[facility] Medicine  Discharge Summary      Patient Name: Morelia Geiger  MRN: [id number]  Admission Date: 3/24/2020  Hospital Length of Stay: 9 days  Discharge Date and Time: 4/3/2020  1:35 PM  Attending Physician: No att. providers found   Discharging Provider: Dayne Emmanuel MD  Primary Care Provider: Ainsley Clements MD      HPI:   The patient is a 39 year old female former smoker with no significant medical history. She presented to the ED with persistent moderate to severe nonproductive cough, associated with fever and mild-moderate shortness of breath for a week. She also reports poor appetite with mild-moderate nausea and some loose stools. She has mid-moderate mid chest pain with cough. She denies sick contacts or recent traveling. 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    In the ED:  Febrile, 101.5  Tachycardia, 100s -110s  O2 saturation 94- 95% on RA   2  Lactic acid 1.2  Ferritin 318  D-dimer 0.79  Total bilirubin 1.5  AST 70, ALT 36  Lactate dehydrogenase 388  Procalcitonin 0.37  Flu negative  Chest radiograph, personally reviewed, bilateral interstitial/ground-glass type pulmonary opacities, right greater than left  EKG, personally reviewed, sinus tachycardia, rate 1006, no ST elevation  COVID-19 qualitative PCR done, report pending    * No surgery found *      Hospital Course:   Patient is a 39-year-old  female with no significant past medical history presented to the ED with shortness of breath, fever and cough.  COVID-19 PCR returned positive.  Chest x-ray with bilateral interstitial airspace opacities consistent with COVID-19 pneumonia.  She was treated with hydroxychloroquine and azithromycin.  Intermittently she also required ceftriaxone for suspected secondary bacterial infection.  She was seen by infectious diseases.  Improvement in symptoms with antibiotics, supplemental oxygen and antitussives.  Deemed medically stable to be  discharged home.  Upon ambulation SpO2 was around 91-92%.      Instructions provided to follow up with primary care physician as outpatient. Patient verbalized understanding and is aware to contact primary care physician or return to ED if new or worsening symptoms.    Physical exam on the day of discharge:  General: Patient resting comfortably in no acute distress.  Lungs: CTA. Good air entry.  Cor: Regular rate and rhythm. No murmurs. No pedal edema.  Abd: Soft. Nontender. Non-distended.  Neuro: A&O x3. Moving all 4 extremities equally  Ext: No clubbing. No cyanosis.          Consults:   Consults (From admission, onward)        Status Ordering Provider     Inpatient consult to Infection Control (Nurse)  Once     Provider:  Hannah Oshea MD    Completed KASEY LAWLER          No new Assessment & Plan notes have been filed under this hospital service since the last note was generated.  Service: Hospital Medicine    Final Active Diagnoses:    Diagnosis Date Noted POA    PRINCIPAL PROBLEM:  Pneumonia due to COVID-19 virus [U07.1, J12.89] 03/25/2020 Yes    Pneumonia [J18.9] 03/25/2020 Yes      Problems Resolved During this Admission:       Discharged Condition: fair    Disposition: Home or Self Care    Follow Up:  Follow-up Information     Ainsley Clements MD In 2 weeks.    Specialty:  Family Medicine  Why:  For check up s/p hospital discharge  Contact information:  [address] [place], [place] [address] [id number] [phone]                 Patient Instructions:      Diet Cardiac     Notify your health care provider if you experience any of the following:  temperature >100.4     Notify your health care provider if you experience any of the following:  persistent nausea and vomiting or diarrhea     Notify your health care provider if you experience any of the following:  persistent dizziness, light-headedness, or visual disturbances     Activity as tolerated       Significant Diagnostic Studies: Labs:    CMP   Recent Labs   Lab 04/02/20  0603 04/03/20  0706    138   K 4.1 4.2    105   CO2 25 25   GLU 83 87   BUN 12 11   CREATININE 0.8 0.9   CALCIUM 8.8 8.9   PROT 7.3 7.7   ALBUMIN 3.0* 3.1*   BILITOT 0.9 0.9   ALKPHOS 42* 146*   AST 32 115*   ALT 22 74*   ANIONGAP 12 8   ESTGFRAFRICA >60.0 >60.0   EGFRNONAA >60.0 >60.0    and CBC   Recent Labs   Lab 04/02/20  0603 04/03/20  0706   WBC 5.98 5.18   HGB 9.9* 10.2*   HCT 32.3* 33.7*   * 428*     Microbiology:  COVID-19 PCR:  Detected    Pending Diagnostic Studies:     None         Medications:  Reconciled Home Medications:      Medication List      START taking these medications    azithromycin 250 MG tablet  Commonly known as:  Z-NICK  Take 1 tablet (250 mg total) by mouth once daily. for 2 days  Start taking on:  April 4, 2020     benzonatate 100 MG capsule  Commonly known as:  TESSALON  Take 1 capsule (100 mg total) by mouth 3 (three) times daily as needed for Cough.     hydroxychloroquine 200 mg tablet  Commonly known as:  PLAQUENIL  Take 2 tablets (400 mg total) by mouth once daily. for 2 days  Start taking on:  April 4, 2020        CONTINUE taking these medications    ESTARYLLA 0.25-35 mg-mcg per tablet  Generic drug:  norgestimate-ethinyl estradioL  Take 1 tablet by mouth once daily.        ASK your doctor about these medications    MUCINEX 1,200 mg Ta12  Generic drug:  guaiFENesin  Take 1 tablet by mouth twice daily.            Indwelling Lines/Drains at time of discharge:   Lines/Drains/Airways     None                 Time spent on the discharge of patient: 35 minutes  Patient was seen and examined on the date of discharge and determined to be suitable for discharge.         Dayne Emmanuel MD  Department of Hospital Medicine  [facility]

## 2020-04-04 NOTE — HOSPITAL COURSE
Patient is a 39-year-old  female with no significant past medical history presented to the ED with shortness of breath, fever and cough.  COVID-19 PCR returned positive.  Chest x-ray with bilateral interstitial airspace opacities consistent with COVID-19 pneumonia.  She was treated with hydroxychloroquine and azithromycin.  Intermittently she also required ceftriaxone for suspected secondary bacterial infection.  She was seen by infectious diseases.  Improvement in symptoms with antibiotics, supplemental oxygen and antitussives.  Deemed medically stable to be discharged home.  Upon ambulation SpO2 was around 91-92%.      Instructions provided to follow up with primary care physician as outpatient. Patient verbalized understanding and is aware to contact primary care physician or return to ED if new or worsening symptoms.    Physical exam on the day of discharge:  General: Patient resting comfortably in no acute distress.  Lungs: CTA. Good air entry.  Cor: Regular rate and rhythm. No murmurs. No pedal edema.  Abd: Soft. Nontender. Non-distended.  Neuro: A&O x3. Moving all 4 extremities equally  Ext: No clubbing. No cyanosis.

## 2020-04-07 ENCOUNTER — PATIENT OUTREACH (OUTPATIENT)
Dept: FAMILY MEDICINE | Facility: CLINIC | Age: 40
End: 2020-04-07

## 2020-04-07 NOTE — PROGRESS NOTES
Initial Outpatient COVID-19 Patient Outreach    Patient: Morelia Geiger  MRN: [id number]  Date of Service: 4/7/2020  Completed by: Maria G Chávez    Brief Summary: Outreach call completed regarding positive COVID-19 test result. Spoke to patient    Assessment Documentation     COVID-19 Assessment    Nurse Assessment via Chart Review:   Date of positive test : 3/24/2020   Was patient Hospitalized? Yes    If so, how many days? 11    Was patient on a ventilator? No    If so, how many days?    Nurse Assessment with Patient:    Are you currently experiencing any worsening symptoms? No  Fever:    Last time your temp was taken? 4/6/2020   What was your last temp reading? 97.6   Are you taking any medications to reduce your fever? No   If yes, what medication?   When was the last time you took a fever reducing medication? Tylenol for aches and pains, no fever  Coughing:    Are you taking any medications to treat your cough? Yes   If yes, what medication? Mucinex BID   On a scale of 1-10 how bad is your cough? 3  Difficulty Breathing:    Are you taking any medications to help with your breathing?  No          If yes, which medications are you taking?         On a scale of 1-10 how difficult is it to breathe normally? 1        Any medical equipment in use? No       Oxygen? No    Nebulizer? No        Other? No       On a normal day, what is your energy level? 50%, but improving         Psycho/Social Assessment:       Do you have a support person/caregiver?  Yes     Are you able to isolate yourself from other family members? Yes           Is anyone else in your home ill with COVID-19? No          Were you working prior to the COVID-19 illness? Yes           Are you still employed? Yes          Are you able to work from home? No    Instructions Given:     Patient verbalized understanding of today's instructions. Encouraged patient/caregiver to communicate with his/her physician and health care team about health conditions or  concerns. Encouraged patient/caregiver to call or message PCP via [employer] with any questions as needed.

## 2020-07-08 ENCOUNTER — OFFICE VISIT (OUTPATIENT)
Dept: DERMATOLOGY | Facility: CLINIC | Age: 40
End: 2020-07-08
Payer: COMMERCIAL

## 2020-07-08 ENCOUNTER — LAB VISIT (OUTPATIENT)
Dept: LAB | Facility: HOSPITAL | Age: 40
End: 2020-07-08
Attending: DERMATOLOGY
Payer: COMMERCIAL

## 2020-07-08 DIAGNOSIS — L21.9 SEBORRHEIC DERMATITIS: ICD-10-CM

## 2020-07-08 DIAGNOSIS — L65.0 TELOGEN EFFLUVIUM: Primary | ICD-10-CM

## 2020-07-08 DIAGNOSIS — E06.3 HASHIMOTO'S DISEASE: ICD-10-CM

## 2020-07-08 DIAGNOSIS — L65.0 TELOGEN EFFLUVIUM: ICD-10-CM

## 2020-07-08 LAB — T4 FREE SERPL-MCNC: 0.91 NG/DL (ref 0.71–1.51)

## 2020-07-08 PROCEDURE — 82306 VITAMIN D 25 HYDROXY: CPT

## 2020-07-08 PROCEDURE — 84432 ASSAY OF THYROGLOBULIN: CPT

## 2020-07-08 PROCEDURE — 86038 ANTINUCLEAR ANTIBODIES: CPT

## 2020-07-08 PROCEDURE — 99999 PR PBB SHADOW E&M-EST. PATIENT-LVL II: CPT | Mod: PBBFAC,,, | Performed by: DERMATOLOGY

## 2020-07-08 PROCEDURE — 99202 OFFICE O/P NEW SF 15 MIN: CPT | Mod: S$GLB,,, | Performed by: DERMATOLOGY

## 2020-07-08 PROCEDURE — 84439 ASSAY OF FREE THYROXINE: CPT

## 2020-07-08 PROCEDURE — 84480 ASSAY TRIIODOTHYRONINE (T3): CPT

## 2020-07-08 PROCEDURE — 99999 PR PBB SHADOW E&M-EST. PATIENT-LVL II: ICD-10-PCS | Mod: PBBFAC,,, | Performed by: DERMATOLOGY

## 2020-07-08 PROCEDURE — 86376 MICROSOMAL ANTIBODY EACH: CPT

## 2020-07-08 PROCEDURE — 36415 COLL VENOUS BLD VENIPUNCTURE: CPT

## 2020-07-08 PROCEDURE — 99202 PR OFFICE/OUTPT VISIT, NEW, LEVL II, 15-29 MIN: ICD-10-PCS | Mod: S$GLB,,, | Performed by: DERMATOLOGY

## 2020-07-08 RX ORDER — KETOCONAZOLE 20 MG/ML
SHAMPOO, SUSPENSION TOPICAL
Qty: 120 ML | Refills: 5 | Status: SHIPPED | OUTPATIENT
Start: 2020-07-08 | End: 2022-12-07

## 2020-07-08 NOTE — PROGRESS NOTES
"  Subjective:       Patient ID:  Morelia Geiger is a 39 y.o. female who presents for   Chief Complaint   Patient presents with    Hair Loss     New Patient     39 y.o. female who presents for hair loss - scalp - will grab "fist fulls" out day - present for about a month - no OTC treatments.   NO change in diet    Had Covid - in March and was given hydro-chlorquine and believes that may have something to do with it.   hospitalized for 8 days in March - high fevers 103-104  Denies vent  History graves disease remotely, not on thyroid meds  Denies family history thyroid disease    Review of Systems   Constitutional: Negative for fever, chills and fatigue.   Respiratory: Negative for cough and shortness of breath.    Skin: Negative for itching, rash, dry skin, sun sensitivity, daily sunscreen use, activity-related sunscreen use, sensitivity to antibiotic ointment and dry lips.   Hematologic/Lymphatic: Does not bruise/bleed easily.      Past Medical History:   Diagnosis Date    Graves disease        Objective:    Physical Exam   Constitutional: She appears well-developed and well-nourished. She is obese.  No distress.   Eyes: Lids are normal.    Neurological: She is alert and oriented to person, place, and time. She is not disoriented.   Psychiatric: She has a normal mood and affect. She is not agitated.   Skin:   Areas Examined (abnormalities noted in diagram):   Scalp / Hair Palpated and Inspected  Head / Face Inspection Performed                   Diagram Legend     Erythematous scaling macule/papule c/w actinic keratosis       Vascular papule c/w angioma      Pigmented verrucoid papule/plaque c/w seborrheic keratosis      Yellow umbilicated papule c/w sebaceous hyperplasia      Irregularly shaped tan macule c/w lentigo     1-2 mm smooth white papules consistent with Milia      Movable subcutaneous cyst with punctum c/w epidermal inclusion cyst      Subcutaneous movable cyst c/w pilar cyst      Firm pink to brown " papule c/w dermatofibroma      Pedunculated fleshy papule(s) c/w skin tag(s)      Evenly pigmented macule c/w junctional nevus     Mildly variegated pigmented, slightly irregular-bordered macule c/w mildly atypical nevus      Flesh colored to evenly pigmented papule c/w intradermal nevus       Pink pearly papule/plaque c/w basal cell carcinoma      Erythematous hyperkeratotic cursted plaque c/w SCC      Surgical scar with no sign of skin cancer recurrence      Open and closed comedones      Inflammatory papules and pustules      Verrucoid papule consistent consistent with wart     Erythematous eczematous patches and plaques     Dystrophic onycholytic nail with subungual debris c/w onychomycosis     Umbilicated papule    Erythematous-base heme-crusted tan verrucoid plaque consistent with inflamed seborrheic keratosis     Erythematous Silvery Scaling Plaque c/w Psoriasis     See annotation      Assessment / Plan:        Telogen effluvium  ~ 3 months s/p hospitalization for COVID  -     VITAMIN D; Future; Expected date: 07/08/2020  -     T4, FREE; Future; Expected date: 07/08/2020  -     T3; Future; Expected date: 07/08/2020  -     THYROGLOBULIN; Future; Expected date: 07/08/2020  -     Thyroid Peroxidase Antibody; Future; Expected date: 07/08/2020  -     CHARLENE; Future; Expected date: 07/08/2020    Hashimoto's disease  -     VITAMIN D; Future; Expected date: 07/08/2020  -     T4, FREE; Future; Expected date: 07/08/2020  -     T3; Future; Expected date: 07/08/2020  -     THYROGLOBULIN; Future; Expected date: 07/08/2020  -     Thyroid Peroxidase Antibody; Future; Expected date: 07/08/2020  -     CHARLENE; Future; Expected date: 07/08/2020    Seborrheic dermatitis  Keto shampoo thrice weekly          Follow up in about 6 weeks (around 8/19/2020).

## 2020-07-09 LAB
25(OH)D3+25(OH)D2 SERPL-MCNC: 28 NG/ML (ref 30–96)
ANA SER QL IF: NORMAL
T3 SERPL-MCNC: 143 NG/DL (ref 60–180)
THRYOGLOBULIN INTERPRETATION: ABNORMAL
THYROGLOB AB SERPL-ACNC: <1.8 IU/ML
THYROGLOB SERPL-MCNC: 159 NG/ML
THYROPEROXIDASE IGG SERPL-ACNC: 442.9 IU/ML

## 2020-12-09 ENCOUNTER — TELEPHONE (OUTPATIENT)
Dept: DERMATOLOGY | Facility: CLINIC | Age: 40
End: 2020-12-09

## 2021-09-08 ENCOUNTER — TELEPHONE (OUTPATIENT)
Dept: FAMILY MEDICINE | Facility: CLINIC | Age: 41
End: 2021-09-08

## 2022-12-07 ENCOUNTER — OFFICE VISIT (OUTPATIENT)
Dept: FAMILY MEDICINE | Facility: CLINIC | Age: 42
End: 2022-12-07
Payer: COMMERCIAL

## 2022-12-07 VITALS
SYSTOLIC BLOOD PRESSURE: 124 MMHG | HEART RATE: 83 BPM | WEIGHT: 244 LBS | BODY MASS INDEX: 36.98 KG/M2 | HEIGHT: 68 IN | DIASTOLIC BLOOD PRESSURE: 74 MMHG

## 2022-12-07 DIAGNOSIS — Z13.0 SCREENING FOR DEFICIENCY ANEMIA: ICD-10-CM

## 2022-12-07 DIAGNOSIS — Z13.228 SCREENING FOR ENDOCRINE, METABOLIC AND IMMUNITY DISORDER: ICD-10-CM

## 2022-12-07 DIAGNOSIS — Z13.6 SCREENING FOR ISCHEMIC HEART DISEASE (IHD): ICD-10-CM

## 2022-12-07 DIAGNOSIS — Z13.0 SCREENING FOR ENDOCRINE, METABOLIC AND IMMUNITY DISORDER: ICD-10-CM

## 2022-12-07 DIAGNOSIS — Z23 NEED FOR TETANUS, DIPHTHERIA, AND ACELLULAR PERTUSSIS (TDAP) VACCINE: ICD-10-CM

## 2022-12-07 DIAGNOSIS — Z23 NEEDS FLU SHOT: ICD-10-CM

## 2022-12-07 DIAGNOSIS — Z00.00 ANNUAL PHYSICAL EXAM: Primary | ICD-10-CM

## 2022-12-07 DIAGNOSIS — Z13.29 SCREENING FOR ENDOCRINE, METABOLIC AND IMMUNITY DISORDER: ICD-10-CM

## 2022-12-07 DIAGNOSIS — E55.9 VITAMIN D DEFICIENCY: ICD-10-CM

## 2022-12-07 DIAGNOSIS — Z86.39 HISTORY OF GRAVES' DISEASE: ICD-10-CM

## 2022-12-07 PROCEDURE — 3078F DIAST BP <80 MM HG: CPT | Mod: CPTII,S$GLB,, | Performed by: FAMILY MEDICINE

## 2022-12-07 PROCEDURE — 99386 PR PREVENTIVE VISIT,NEW,40-64: ICD-10-PCS | Mod: 25,S$GLB,, | Performed by: FAMILY MEDICINE

## 2022-12-07 PROCEDURE — 90682 RIV4 VACC RECOMBINANT DNA IM: CPT | Mod: S$GLB,,, | Performed by: FAMILY MEDICINE

## 2022-12-07 PROCEDURE — 90471 IMMUNIZATION ADMIN: CPT | Mod: S$GLB,,, | Performed by: FAMILY MEDICINE

## 2022-12-07 PROCEDURE — 3074F PR MOST RECENT SYSTOLIC BLOOD PRESSURE < 130 MM HG: ICD-10-PCS | Mod: CPTII,S$GLB,, | Performed by: FAMILY MEDICINE

## 2022-12-07 PROCEDURE — 90471 FLU VACCINE - QUADRIVALENT (RECOMBINANT) PRESERVATIVE FREE: ICD-10-PCS | Mod: S$GLB,,, | Performed by: FAMILY MEDICINE

## 2022-12-07 PROCEDURE — 99386 PREV VISIT NEW AGE 40-64: CPT | Mod: 25,S$GLB,, | Performed by: FAMILY MEDICINE

## 2022-12-07 PROCEDURE — 90715 TDAP VACCINE GREATER THAN OR EQUAL TO 7YO IM: ICD-10-PCS | Mod: S$GLB,,, | Performed by: FAMILY MEDICINE

## 2022-12-07 PROCEDURE — 90472 IMMUNIZATION ADMIN EACH ADD: CPT | Mod: S$GLB,,, | Performed by: FAMILY MEDICINE

## 2022-12-07 PROCEDURE — 3008F BODY MASS INDEX DOCD: CPT | Mod: CPTII,S$GLB,, | Performed by: FAMILY MEDICINE

## 2022-12-07 PROCEDURE — 3078F PR MOST RECENT DIASTOLIC BLOOD PRESSURE < 80 MM HG: ICD-10-PCS | Mod: CPTII,S$GLB,, | Performed by: FAMILY MEDICINE

## 2022-12-07 PROCEDURE — 1159F PR MEDICATION LIST DOCUMENTED IN MEDICAL RECORD: ICD-10-PCS | Mod: CPTII,S$GLB,, | Performed by: FAMILY MEDICINE

## 2022-12-07 PROCEDURE — 1159F MED LIST DOCD IN RCRD: CPT | Mod: CPTII,S$GLB,, | Performed by: FAMILY MEDICINE

## 2022-12-07 PROCEDURE — 90472 TDAP VACCINE GREATER THAN OR EQUAL TO 7YO IM: ICD-10-PCS | Mod: S$GLB,,, | Performed by: FAMILY MEDICINE

## 2022-12-07 PROCEDURE — 3074F SYST BP LT 130 MM HG: CPT | Mod: CPTII,S$GLB,, | Performed by: FAMILY MEDICINE

## 2022-12-07 PROCEDURE — 90682 FLU VACCINE - QUADRIVALENT (RECOMBINANT) PRESERVATIVE FREE: ICD-10-PCS | Mod: S$GLB,,, | Performed by: FAMILY MEDICINE

## 2022-12-07 PROCEDURE — 3008F PR BODY MASS INDEX (BMI) DOCUMENTED: ICD-10-PCS | Mod: CPTII,S$GLB,, | Performed by: FAMILY MEDICINE

## 2022-12-07 PROCEDURE — 90715 TDAP VACCINE 7 YRS/> IM: CPT | Mod: S$GLB,,, | Performed by: FAMILY MEDICINE

## 2022-12-07 NOTE — PROGRESS NOTES
SUBJECTIVE:   HPI: Morelia Geiger  is a 42 y.o. female who presents for annual physical .   Establish Care / Wellness Visit, flu vaccine today, complete physical form, and discuss thyroid diagnosis    Patient with history of Graves disease presents to establish care.  She was diagnosed 7 years ago.  Has not required medication in some time.  She is up-to-date with vision screenings.  She is up-to-date with the annual well-woman visit and mammogram.  She is  social smoker and social drinker currently taking oral contraception.  Menstrual cycles are regular.  She has received COVID vaccinations.  She is due flu and tetanus.      She has a strong family history of hypertension and diabetes.  Has not had any recent testing.  Reports in May had an episode left-sided weakness from the shoulder to feet.  Could not use her left hand.  Reports after few days is wish to her right hand.  Had minimal consultation with physician who suggested possible pinched nerve.  No other evaluation performed.  Questioning does reveal that patient was under a significant amount of stress during this time.  She is had no problems since that time     (Not in a hospital admission)    Review of patient's allergies indicates:   Allergen Reactions    Sulfasalazine Itching    Sulfa (sulfonamide antibiotics) Rash     Current Outpatient Medications on File Prior to Visit   Medication Sig Dispense Refill    naproxen sodium (ALEVE ORAL) Take by mouth 2 (two) times daily as needed.      ESTARYLLA 0.25-35 mg-mcg per tablet Take 1 tablet by mouth once daily.      [DISCONTINUED] guaiFENesin (MUCINEX) 1,200 mg Ta12 Take 1 tablet by mouth twice daily. (Patient not taking: Reported on 12/7/2022) 14 tablet 0    [DISCONTINUED] ketoconazole (NIZORAL) 2 % shampoo Wash hair with medicated shampoo at least 2x/week - let sit on scalp at least 5 minutes prior to rinsing (Patient not taking: Reported on 12/7/2022) 120 mL 5     No current  facility-administered medications on file prior to visit.     Past Medical History:   Diagnosis Date    Graves disease      Past Surgical History:   Procedure Laterality Date     SECTION  2014    WISDOM TOOTH EXTRACTION       Family History   Problem Relation Age of Onset    Hypertension Mother     Hypertension Father     Heart disease Father     Kidney failure Father     Diabetes Father     Hyperlipidemia Father     Stroke Father     No Known Problems Sister     No Known Problems Brother     No Known Problems Daughter     Heart disease Maternal Grandmother     Cancer Maternal Grandfather     Cancer Paternal Grandfather         stomach    No Known Problems Brother     No Known Problems Brother     No Known Problems Daughter      Social History     Tobacco Use    Smoking status: Some Days     Packs/day: 0.50     Years: 10.00     Pack years: 5.00     Types: Cigarettes     Start date: 2007    Smokeless tobacco: Never    Tobacco comments:     Social smoker   Substance Use Topics    Alcohol use: Yes     Comment: socially    Drug use: No      Health Maintenance Topics with due status: Not Due       Topic Last Completion Date    TETANUS VACCINE 2022     Immunization History   Administered Date(s) Administered    COVID-19, MRNA, LN-S, PF (MODERNA FULL 0.5 ML DOSE) 2021, 2021    DTP 01/15/1980, 1980, 1980, 1981, 08/15/1986    Influenza (FLUBLOK) - Quadrivalent - Recombinant - PF *Preferred* (egg allergy) 2022    Influenza - Quadrivalent - PF *Preferred* (6 months and older) 2013, 10/06/2021    MMR 1981, 1997    OPV 01/15/1980, 1980, 1981, 08/15/1986    PPD Test 2016    Td (ADULT) 1997    Tdap 2022       Review of Systems   Constitutional:  Negative for activity change, fatigue and unexpected weight change.   HENT:  Negative for hearing loss, postnasal drip, sinus pressure, sore throat and voice change.    Eyes:   "Negative for photophobia and visual disturbance.   Respiratory:  Negative for cough, shortness of breath and wheezing.    Cardiovascular:  Negative for chest pain and palpitations.   Gastrointestinal:  Negative for constipation, diarrhea and nausea.   Genitourinary:  Negative for difficulty urinating, frequency, hematuria and urgency.   Musculoskeletal:  Positive for myalgias and neck pain. Negative for arthralgias and back pain.   Skin:  Negative for rash.   Neurological:  Negative for weakness, light-headedness and headaches.   Hematological:  Negative for adenopathy. Does not bruise/bleed easily.   Psychiatric/Behavioral:  The patient is nervous/anxious.     OBJECTIVE:      Vitals:    12/07/22 1614   BP: 124/74   Pulse: 83   Weight: 110.7 kg (244 lb)   Height: 5' 8" (1.727 m)     Physical Exam  Vitals reviewed.   Constitutional:       General: She is not in acute distress.     Appearance: Normal appearance. She is well-developed. She is obese.   HENT:      Head: Normocephalic and atraumatic.      Right Ear: External ear normal.      Left Ear: External ear normal.      Nose: Nose normal.      Mouth/Throat:      Mouth: Mucous membranes are moist.   Eyes:      General: Lids are normal.      Conjunctiva/sclera: Conjunctivae normal.      Pupils: Pupils are equal, round, and reactive to light.   Neck:      Thyroid: No thyromegaly.      Vascular: No JVD.      Trachea: No tracheal deviation.   Cardiovascular:      Rate and Rhythm: Normal rate and regular rhythm.      Chest Wall: PMI is not displaced.      Pulses: Normal pulses.      Heart sounds: Normal heart sounds.   Pulmonary:      Effort: Pulmonary effort is normal.      Breath sounds: Normal breath sounds.   Abdominal:      General: Bowel sounds are normal.      Palpations: Abdomen is soft.      Tenderness: There is no abdominal tenderness. There is no guarding or rebound.   Musculoskeletal:         General: No tenderness. Normal range of motion.      Cervical back: " Full passive range of motion without pain and neck supple.   Skin:     General: Skin is warm and dry.      Findings: No rash.   Neurological:      General: No focal deficit present.      Mental Status: She is alert and oriented to person, place, and time.      Motor: No weakness.      Coordination: Coordination normal.   Psychiatric:         Mood and Affect: Mood normal.         Behavior: Behavior normal.      Assessment:       1. Annual physical exam    2. History of Graves' disease    3. Vitamin D deficiency    4. Screening for ischemic heart disease (IHD)    5. Screening for endocrine, metabolic and immunity disorder    6. Screening for deficiency anemia    7. Need for tetanus, diphtheria, and acellular pertussis (Tdap) vaccine    8. Needs flu shot        Plan:       Annual physical exam  -     CBC Auto Differential; Future; Expected date: 12/07/2022  -     Iron; Future; Expected date: 12/07/2022  -     Comprehensive Metabolic Panel; Future; Expected date: 12/07/2022  -     Hemoglobin A1C; Future; Expected date: 12/07/2022  -     Lipid Panel; Future; Expected date: 12/07/2022  -     Urinalysis; Future; Expected date: 12/07/2022  -     TSH; Future; Expected date: 12/07/2022  -     T4, Free; Future; Expected date: 12/07/2022  -     Vitamin D; Future; Expected date: 12/07/2022    History of Graves' disease  -     TSH; Future; Expected date: 12/07/2022  -     T4, Free; Future; Expected date: 12/07/2022    Vitamin D deficiency  -     Vitamin D; Future; Expected date: 12/07/2022    Screening for ischemic heart disease (IHD)  -     Lipid Panel; Future; Expected date: 12/07/2022    Screening for endocrine, metabolic and immunity disorder  -     Iron; Future; Expected date: 12/07/2022  -     Comprehensive Metabolic Panel; Future; Expected date: 12/07/2022  -     Hemoglobin A1C; Future; Expected date: 12/07/2022  -     TSH; Future; Expected date: 12/07/2022  -     T4, Free; Future; Expected date: 12/07/2022  -     Vitamin  D; Future; Expected date: 12/07/2022    Screening for deficiency anemia  -     CBC Auto Differential; Future; Expected date: 12/07/2022  -     Iron; Future; Expected date: 12/07/2022    Need for tetanus, diphtheria, and acellular pertussis (Tdap) vaccine  -     (In Office Administered) Tdap Vaccine    Needs flu shot  -     Influenza - Quadrivalent (Recombinant) (PF)      Counseled on age and gender appropriate medical preventative services, including cancer screenings, immunizations, overall nutritional health, need for a consistent exercise regimen and an overall push towards maintaining a vigorous and active lifestyle.      Follow up in about 1 year (around 12/7/2023) for Well Woman w/Pap.

## 2022-12-13 ENCOUNTER — PATIENT MESSAGE (OUTPATIENT)
Dept: FAMILY MEDICINE | Facility: CLINIC | Age: 42
End: 2022-12-13

## 2022-12-14 RX ORDER — ERGOCALCIFEROL 1.25 MG/1
50000 CAPSULE ORAL
Qty: 4 CAPSULE | Refills: 5 | Status: SHIPPED | OUTPATIENT
Start: 2022-12-14 | End: 2023-06-06 | Stop reason: SDUPTHER

## 2022-12-14 NOTE — TELEPHONE ENCOUNTER
Per dr mcguire needs vitamin d 50,000 weeks, and OTC iron 325mg daily.     Spoke with patient notified of message/instructions per dr mcguire.  Patient verbalized understanding.  Informed to return call with questions/concerns -KYLIE

## 2022-12-15 ENCOUNTER — PATIENT MESSAGE (OUTPATIENT)
Dept: FAMILY MEDICINE | Facility: CLINIC | Age: 42
End: 2022-12-15

## 2022-12-15 NOTE — TELEPHONE ENCOUNTER
prescription sent to   [id number] - [place], [place] - [address] AT [facility] [address]  [address]  [place] [address]  Phone: [phone] Fax: [phone]

## 2023-01-05 ENCOUNTER — PATIENT MESSAGE (OUTPATIENT)
Dept: FAMILY MEDICINE | Facility: CLINIC | Age: 43
End: 2023-01-05

## 2023-01-06 RX ORDER — PHENTERMINE HYDROCHLORIDE 37.5 MG/1
37.5 TABLET ORAL
Qty: 30 TABLET | Refills: 0 | Status: SHIPPED | OUTPATIENT
Start: 2023-01-06 | End: 2023-02-05

## 2023-03-06 ENCOUNTER — PATIENT MESSAGE (OUTPATIENT)
Dept: FAMILY MEDICINE | Facility: CLINIC | Age: 43
End: 2023-03-06

## 2023-03-06 DIAGNOSIS — R20.0 NUMBNESS: Primary | ICD-10-CM

## 2023-04-11 ENCOUNTER — PATIENT MESSAGE (OUTPATIENT)
Dept: ADMINISTRATIVE | Facility: HOSPITAL | Age: 43
End: 2023-04-11
Payer: COMMERCIAL

## 2023-04-13 ENCOUNTER — PATIENT MESSAGE (OUTPATIENT)
Dept: FAMILY MEDICINE | Facility: CLINIC | Age: 43
End: 2023-04-13

## 2023-04-13 NOTE — TELEPHONE ENCOUNTER
Spoke with patient, states she is going to try to move up neurology appt.  Will return call if needed -KYLIE

## 2023-04-14 DIAGNOSIS — Z12.31 OTHER SCREENING MAMMOGRAM: ICD-10-CM

## 2023-04-17 ENCOUNTER — PATIENT MESSAGE (OUTPATIENT)
Dept: ADMINISTRATIVE | Facility: HOSPITAL | Age: 43
End: 2023-04-17
Payer: COMMERCIAL

## 2023-04-26 ENCOUNTER — TELEPHONE (OUTPATIENT)
Dept: FAMILY MEDICINE | Facility: CLINIC | Age: 43
End: 2023-04-26

## 2023-04-26 NOTE — TELEPHONE ENCOUNTER
----- Message from Carina Sanchez sent at 4/26/2023  4:00 PM CDT -----  Pt called to get a follow up appt. [phone]

## 2023-04-26 NOTE — TELEPHONE ENCOUNTER
Spoke with patient regarding appt needed for neurology results. Appt available on 05/16/2023 at 8 am. Patient verbalized understanding.

## 2023-05-16 ENCOUNTER — TELEPHONE (OUTPATIENT)
Dept: FAMILY MEDICINE | Facility: CLINIC | Age: 43
End: 2023-05-16

## 2023-05-16 NOTE — TELEPHONE ENCOUNTER
Spoke with patient rescheduled appt for 6/6/2023. Jennifer states she has lab work in her mychart.

## 2023-06-06 ENCOUNTER — OFFICE VISIT (OUTPATIENT)
Dept: FAMILY MEDICINE | Facility: CLINIC | Age: 43
End: 2023-06-06
Attending: FAMILY MEDICINE
Payer: COMMERCIAL

## 2023-06-06 ENCOUNTER — TELEPHONE (OUTPATIENT)
Dept: FAMILY MEDICINE | Facility: CLINIC | Age: 43
End: 2023-06-06

## 2023-06-06 VITALS
WEIGHT: 253.63 LBS | HEART RATE: 72 BPM | HEIGHT: 67 IN | OXYGEN SATURATION: 99 % | DIASTOLIC BLOOD PRESSURE: 62 MMHG | SYSTOLIC BLOOD PRESSURE: 114 MMHG | BODY MASS INDEX: 39.81 KG/M2

## 2023-06-06 DIAGNOSIS — D50.8 IRON DEFICIENCY ANEMIA SECONDARY TO INADEQUATE DIETARY IRON INTAKE: ICD-10-CM

## 2023-06-06 DIAGNOSIS — E66.01 CLASS 3 SEVERE OBESITY DUE TO EXCESS CALORIES WITH BODY MASS INDEX (BMI) OF 40.0 TO 44.9 IN ADULT, UNSPECIFIED WHETHER SERIOUS COMORBIDITY PRESENT: ICD-10-CM

## 2023-06-06 DIAGNOSIS — Z86.39 HISTORY OF GRAVES' DISEASE: ICD-10-CM

## 2023-06-06 DIAGNOSIS — E55.9 VITAMIN D DEFICIENCY: ICD-10-CM

## 2023-06-06 DIAGNOSIS — M19.90 INFLAMMATORY ARTHROPATHY: Primary | ICD-10-CM

## 2023-06-06 DIAGNOSIS — E04.9 GOITER: ICD-10-CM

## 2023-06-06 PROBLEM — J30.9 ALLERGIC RHINITIS: Status: ACTIVE | Noted: 2018-08-29

## 2023-06-06 PROCEDURE — 3078F PR MOST RECENT DIASTOLIC BLOOD PRESSURE < 80 MM HG: ICD-10-PCS | Mod: CPTII,S$GLB,, | Performed by: FAMILY MEDICINE

## 2023-06-06 PROCEDURE — 1159F PR MEDICATION LIST DOCUMENTED IN MEDICAL RECORD: ICD-10-PCS | Mod: CPTII,S$GLB,, | Performed by: FAMILY MEDICINE

## 2023-06-06 PROCEDURE — 3074F SYST BP LT 130 MM HG: CPT | Mod: CPTII,S$GLB,, | Performed by: FAMILY MEDICINE

## 2023-06-06 PROCEDURE — 3074F PR MOST RECENT SYSTOLIC BLOOD PRESSURE < 130 MM HG: ICD-10-PCS | Mod: CPTII,S$GLB,, | Performed by: FAMILY MEDICINE

## 2023-06-06 PROCEDURE — 1159F MED LIST DOCD IN RCRD: CPT | Mod: CPTII,S$GLB,, | Performed by: FAMILY MEDICINE

## 2023-06-06 PROCEDURE — 99214 PR OFFICE/OUTPT VISIT, EST, LEVL IV, 30-39 MIN: ICD-10-PCS | Mod: S$GLB,,, | Performed by: FAMILY MEDICINE

## 2023-06-06 PROCEDURE — 3008F BODY MASS INDEX DOCD: CPT | Mod: CPTII,S$GLB,, | Performed by: FAMILY MEDICINE

## 2023-06-06 PROCEDURE — 99214 OFFICE O/P EST MOD 30 MIN: CPT | Mod: S$GLB,,, | Performed by: FAMILY MEDICINE

## 2023-06-06 PROCEDURE — 3008F PR BODY MASS INDEX (BMI) DOCUMENTED: ICD-10-PCS | Mod: CPTII,S$GLB,, | Performed by: FAMILY MEDICINE

## 2023-06-06 PROCEDURE — 1160F PR REVIEW ALL MEDS BY PRESCRIBER/CLIN PHARMACIST DOCUMENTED: ICD-10-PCS | Mod: CPTII,S$GLB,, | Performed by: FAMILY MEDICINE

## 2023-06-06 PROCEDURE — 3078F DIAST BP <80 MM HG: CPT | Mod: CPTII,S$GLB,, | Performed by: FAMILY MEDICINE

## 2023-06-06 PROCEDURE — 1160F RVW MEDS BY RX/DR IN RCRD: CPT | Mod: CPTII,S$GLB,, | Performed by: FAMILY MEDICINE

## 2023-06-06 RX ORDER — SEMAGLUTIDE 0.25 MG/.5ML
0.25 INJECTION, SOLUTION SUBCUTANEOUS
Qty: 2 ML | Refills: 1 | Status: SHIPPED | OUTPATIENT
Start: 2023-06-06

## 2023-06-06 RX ORDER — ERGOCALCIFEROL 1.25 MG/1
50000 CAPSULE ORAL
Qty: 4 CAPSULE | Refills: 11 | Status: SHIPPED | OUTPATIENT
Start: 2023-06-06

## 2023-06-06 NOTE — TELEPHONE ENCOUNTER
----- Message from Magdalena Rocha sent at 6/6/2023  3:56 PM CDT -----  Pt needs an appointment in 3 months with Dr Roca. There was no availability when I checked.  [phone]

## 2023-06-06 NOTE — PROGRESS NOTES
SUBJECTIVE:    Patient ID: Morelia Geiger is a 42 y.o. female.    Chief Complaint: Follow-up (6 mo follow up/pt has own lab work results/discuss joint pain/has mammo appt set/discuss weight loss options/pap smear done//mp)    Patient with history of Graves disease presents for follow-up visit.  She complains of having joint pain for the past 3 months.  She denies any joint swelling.  She has used the leave and Tylenol that helps with the symptoms.  She also reports some left-sided numbness that occurred at the same time her pains began.  She still feels that she has some left-sided muscle weakness.  The joint pain resolved after about 4 weeks but now occurs sporadically in different spots.  Currently she is having trouble in her ankles knees and shoulders.  She reports the areas are not tender to the touch.  Reports numbness first occurred May 2022 resolved after 4-6 weeks     Recent labs show iron-deficiency anemia and low vitamin-D.  Also notably has an elevated CRP and sed rate.  Reviewing previous labs she has had elevated CRP and ESR values in the past.  An CHARLENE done a few years back however was noted to be negative    She has some additional concerns about weight.  Has been trying conservative measures on own and would like to try medical weight loss options.            No visits with results within 6 Month(s) from this visit.   Latest known visit with results is:   Lab Visit on 07/08/2020   Component Date Value Ref Range Status    Vit D, 25-Hydroxy 07/08/2020 28 (L)  30 - 96 ng/mL Final    Free T4 07/08/2020 0.91  0.71 - 1.51 ng/dL Final    T3, Total 07/08/2020 143  60 - 180 ng/dL Final    Thyroglobulin, Tumor Marker 07/08/2020 159 (H)  ng/mL Final    Thyroglobulin Antibody Screen 07/08/2020 <1.8  <1.8 IU/mL Final    Thyroglobulin Interpretation 07/08/2020 SEE BELOW   Final    Thyroperoxidase Antibodies 07/08/2020 442.9 (H)  <6.0 IU/mL Final    CHARLENE Screen 07/08/2020 Negative <1:80  Negative <1:80 Final         Past Medical History:   Diagnosis Date    Graves disease     Pneumonia due to COVID-19 virus 3/25/2020     Past Surgical History:   Procedure Laterality Date     SECTION  2014    WISDOM TOOTH EXTRACTION       Family History   Problem Relation Age of Onset    Hypertension Mother     Hypertension Father     Heart disease Father     Kidney failure Father     Diabetes Father     Hyperlipidemia Father     Stroke Father     No Known Problems Sister     No Known Problems Brother     No Known Problems Daughter     Heart disease Maternal Grandmother     Cancer Maternal Grandfather     Cancer Paternal Grandfather         stomach    No Known Problems Brother     No Known Problems Brother     No Known Problems Daughter        Marital Status:   Alcohol History:  reports current alcohol use.  Tobacco History:  reports that she has been smoking cigarettes. She started smoking about 15 years ago. She has a 5.00 pack-year smoking history. She has never used smokeless tobacco.  Drug History:  reports no history of drug use.    Review of patient's allergies indicates:   Allergen Reactions    Sulfasalazine Itching    Sulfa (sulfonamide antibiotics) Rash       Current Outpatient Medications:     ESTARYLLA 0.25-35 mg-mcg per tablet, Take 1 tablet by mouth once daily., Disp: , Rfl:     ergocalciferol (ERGOCALCIFEROL) 50,000 unit Cap, Take 1 capsule (50,000 Units total) by mouth every 7 days., Disp: 4 capsule, Rfl: 11    semaglutide, weight loss, (WEGOVY) 0.25 mg/0.5 mL PnIj, Inject 0.25 mg into the skin every 7 days., Disp: 2 mL, Rfl: 1    Review of Systems   Constitutional:  Positive for fatigue. Negative for activity change and unexpected weight change.   HENT:  Negative for hearing loss, postnasal drip, sinus pressure, sore throat and voice change.    Eyes:  Negative for photophobia and visual disturbance.   Respiratory:  Negative for cough, shortness of breath and wheezing.    Cardiovascular:  Negative for chest  "pain and palpitations.   Gastrointestinal:  Negative for constipation, diarrhea and nausea.   Genitourinary:  Negative for difficulty urinating, frequency, hematuria and urgency.   Musculoskeletal:  Positive for arthralgias and myalgias. Negative for back pain.   Skin:  Negative for rash.   Neurological:  Negative for weakness, light-headedness and headaches.   Hematological:  Negative for adenopathy. Does not bruise/bleed easily.   Psychiatric/Behavioral:  The patient is not nervous/anxious.         Objective:      Vitals:    06/06/23 1502   BP: 114/62   Pulse: 72   SpO2: 99%   Weight: 115 kg (253 lb 9.6 oz)   Height: 5' 6.73" (1.695 m)     Physical Exam  Constitutional:       Appearance: Normal appearance. She is obese.   HENT:      Head: Normocephalic and atraumatic.      Mouth/Throat:      Mouth: Mucous membranes are moist.   Eyes:      Conjunctiva/sclera: Conjunctivae normal.   Neck:      Thyroid: Thyromegaly present. No thyroid tenderness.   Pulmonary:      Effort: Pulmonary effort is normal.   Musculoskeletal:         General: No swelling, tenderness or deformity. Normal range of motion.   Neurological:      General: No focal deficit present.      Mental Status: She is alert and oriented to person, place, and time.   Psychiatric:         Mood and Affect: Mood normal.         Behavior: Behavior normal.         Assessment:       1. Inflammatory arthropathy    2. Iron deficiency anemia secondary to inadequate dietary iron intake    3. Class 3 severe obesity due to excess calories with body mass index (BMI) of 40.0 to 44.9 in adult, unspecified whether serious comorbidity present    4. Vitamin D deficiency    5. Goiter    6. History of Graves' disease         Plan:       Inflammatory arthropathy  Rheumatology referral    Iron deficiency anemia secondary to inadequate dietary iron intake  Continue multivitamin with iron    Class 3 severe obesity due to excess calories with body mass index (BMI) of 40.0 to 44.9 in " adult, unspecified whether serious comorbidity present  -     semaglutide, weight loss, (WEGOVY) 0.25 mg/0.5 mL PnIj; Inject 0.25 mg into the skin every 7 days.  Dispense: 2 mL; Refill: 1    Vitamin D deficiency  -     ergocalciferol (ERGOCALCIFEROL) 50,000 unit Cap; Take 1 capsule (50,000 Units total) by mouth every 7 days.  Dispense: 4 capsule; Refill: 11  Resume prescription strength vitamin-D    Goiter    History of Graves' disease  No recent therapy require    Follow up in 3 months (on 9/6/2023), or keep current, for weight .

## 2023-06-07 ENCOUNTER — PATIENT MESSAGE (OUTPATIENT)
Dept: FAMILY MEDICINE | Facility: CLINIC | Age: 43
End: 2023-06-07

## 2023-06-07 DIAGNOSIS — M19.90 INFLAMMATORY ARTHROPATHY: ICD-10-CM

## 2023-06-07 DIAGNOSIS — Z86.39 HISTORY OF GRAVES' DISEASE: Primary | ICD-10-CM

## 2023-06-10 PROBLEM — U07.1 PNEUMONIA DUE TO COVID-19 VIRUS: Status: RESOLVED | Noted: 2020-03-25 | Resolved: 2023-06-10

## 2023-06-10 PROBLEM — Z86.39 HISTORY OF GRAVES' DISEASE: Status: ACTIVE | Noted: 2023-06-10

## 2023-06-10 PROBLEM — J18.9 PNEUMONIA: Status: RESOLVED | Noted: 2020-03-25 | Resolved: 2023-06-10

## 2023-06-10 PROBLEM — J12.82 PNEUMONIA DUE TO COVID-19 VIRUS: Status: RESOLVED | Noted: 2020-03-25 | Resolved: 2023-06-10

## 2023-06-10 PROBLEM — E55.9 VITAMIN D DEFICIENCY: Status: ACTIVE | Noted: 2023-06-10

## 2023-06-12 ENCOUNTER — TELEPHONE (OUTPATIENT)
Dept: FAMILY MEDICINE | Facility: CLINIC | Age: 43
End: 2023-06-12

## 2023-06-12 NOTE — TELEPHONE ENCOUNTER
----- Message from Page Cruz MA sent at 6/7/2023  5:10 PM CDT -----    ----- Message -----  From: Licha Singh MA  Sent: 6/7/2023  12:46 PM CDT  To: Kevan Sommer

## 2023-06-13 NOTE — TELEPHONE ENCOUNTER
Per Dr Roca:  Affordable option are adipex and topamax.     Spoke with patient notified of message per dr roca.  Patient declined both adipex and topamax at this time -DN

## 2023-06-20 ENCOUNTER — PATIENT MESSAGE (OUTPATIENT)
Dept: FAMILY MEDICINE | Facility: CLINIC | Age: 43
End: 2023-06-20

## 2023-06-20 DIAGNOSIS — M19.90 INFLAMMATORY ARTHROPATHY: Primary | ICD-10-CM

## 2023-06-27 ENCOUNTER — TELEPHONE (OUTPATIENT)
Dept: FAMILY MEDICINE | Facility: CLINIC | Age: 43
End: 2023-06-27

## 2023-06-27 NOTE — TELEPHONE ENCOUNTER
----- Message from Lamar Billings sent at 6/27/2023 10:19 AM CDT -----  - ailyn with [facility] is calling and they received the demographics but needs office notes and test.   [phone] fax   [phone]

## 2023-07-26 ENCOUNTER — PATIENT MESSAGE (OUTPATIENT)
Dept: FAMILY MEDICINE | Facility: CLINIC | Age: 43
End: 2023-07-26

## 2023-12-06 ENCOUNTER — TELEPHONE (OUTPATIENT)
Dept: FAMILY MEDICINE | Facility: CLINIC | Age: 43
End: 2023-12-06

## 2023-12-07 ENCOUNTER — TELEPHONE (OUTPATIENT)
Dept: FAMILY MEDICINE | Facility: CLINIC | Age: 43
End: 2023-12-07

## 2023-12-07 NOTE — TELEPHONE ENCOUNTER
Left detailed message for pt. Appt needs to be r/s due to Dr. Roca out of the office. Appt canceled.

## 2024-05-14 ENCOUNTER — TELEPHONE (OUTPATIENT)
Dept: FAMILY MEDICINE | Facility: CLINIC | Age: 44
End: 2024-05-14
Payer: COMMERCIAL

## 2024-05-14 NOTE — TELEPHONE ENCOUNTER
----- Message from Lauren Ro sent at 5/14/2024  8:59 AM CDT -----  Pt needs an appt for a Annual Wellness visit. Sometime between June and July. Pt #[phone]

## 2024-08-06 ENCOUNTER — OFFICE VISIT (OUTPATIENT)
Dept: FAMILY MEDICINE | Facility: CLINIC | Age: 44
End: 2024-08-06
Payer: COMMERCIAL

## 2024-08-06 VITALS
WEIGHT: 227 LBS | OXYGEN SATURATION: 99 % | HEIGHT: 67 IN | BODY MASS INDEX: 35.63 KG/M2 | SYSTOLIC BLOOD PRESSURE: 122 MMHG | HEART RATE: 78 BPM | DIASTOLIC BLOOD PRESSURE: 80 MMHG

## 2024-08-06 DIAGNOSIS — Z87.898 H/O MOTION SICKNESS: ICD-10-CM

## 2024-08-06 DIAGNOSIS — M19.90 INFLAMMATORY ARTHROPATHY: ICD-10-CM

## 2024-08-06 DIAGNOSIS — Z00.00 ANNUAL PHYSICAL EXAM: Primary | ICD-10-CM

## 2024-08-06 DIAGNOSIS — Z86.39 HISTORY OF GRAVES' DISEASE: ICD-10-CM

## 2024-08-06 DIAGNOSIS — E55.9 VITAMIN D DEFICIENCY: ICD-10-CM

## 2024-08-06 DIAGNOSIS — Z12.31 VISIT FOR SCREENING MAMMOGRAM: ICD-10-CM

## 2024-08-06 DIAGNOSIS — D50.8 IRON DEFICIENCY ANEMIA SECONDARY TO INADEQUATE DIETARY IRON INTAKE: ICD-10-CM

## 2024-08-06 PROCEDURE — 3008F BODY MASS INDEX DOCD: CPT | Mod: CPTII,,, | Performed by: FAMILY MEDICINE

## 2024-08-06 PROCEDURE — 99396 PREV VISIT EST AGE 40-64: CPT | Mod: ,,, | Performed by: FAMILY MEDICINE

## 2024-08-06 PROCEDURE — 1159F MED LIST DOCD IN RCRD: CPT | Mod: CPTII,,, | Performed by: FAMILY MEDICINE

## 2024-08-06 PROCEDURE — 3074F SYST BP LT 130 MM HG: CPT | Mod: CPTII,,, | Performed by: FAMILY MEDICINE

## 2024-08-06 PROCEDURE — 3079F DIAST BP 80-89 MM HG: CPT | Mod: CPTII,,, | Performed by: FAMILY MEDICINE

## 2024-08-06 RX ORDER — ERGOCALCIFEROL 1.25 MG/1
50000 CAPSULE ORAL
Qty: 4 CAPSULE | Refills: 11 | Status: SHIPPED | OUTPATIENT
Start: 2024-08-06

## 2024-08-06 RX ORDER — SCOLOPAMINE TRANSDERMAL SYSTEM 1 MG/1
1 PATCH, EXTENDED RELEASE TRANSDERMAL
Qty: 4 PATCH | Refills: 0 | Status: SHIPPED | OUTPATIENT
Start: 2024-08-06

## 2024-08-13 ENCOUNTER — PATIENT MESSAGE (OUTPATIENT)
Dept: FAMILY MEDICINE | Facility: CLINIC | Age: 44
End: 2024-08-13
Payer: COMMERCIAL

## 2024-08-13 NOTE — LETTER
[address]  [place] [address]  Phone: [phone]   [phone]                        MD Lance Gr MD Chequita Williams, MD Matthew Bassett, PA-C Allison Hoffritz, JOVANNA Tirado, JOVANNA      Date: 08/15/2024        Patient: Morelia Kirby  YOB: 1980      Please fax  lab results.         Sincerely,     Moon Campos LPN    Electronically Signed By: Kevan Roca MD

## 2024-09-05 ENCOUNTER — PATIENT MESSAGE (OUTPATIENT)
Dept: FAMILY MEDICINE | Facility: CLINIC | Age: 44
End: 2024-09-05
Payer: COMMERCIAL

## 2024-09-12 RX ORDER — METHYLPREDNISOLONE 4 MG/1
TABLET ORAL
Qty: 21 EACH | Refills: 0 | Status: SHIPPED | OUTPATIENT
Start: 2024-09-12 | End: 2024-10-03

## 2024-10-16 ENCOUNTER — PATIENT MESSAGE (OUTPATIENT)
Dept: FAMILY MEDICINE | Facility: CLINIC | Age: 44
End: 2024-10-16
Payer: COMMERCIAL

## 2025-08-20 ENCOUNTER — PATIENT MESSAGE (OUTPATIENT)
Dept: ADMINISTRATIVE | Facility: HOSPITAL | Age: 45
End: 2025-08-20
Payer: COMMERCIAL